# Patient Record
Sex: MALE | Race: WHITE | NOT HISPANIC OR LATINO | Employment: OTHER | URBAN - METROPOLITAN AREA
[De-identification: names, ages, dates, MRNs, and addresses within clinical notes are randomized per-mention and may not be internally consistent; named-entity substitution may affect disease eponyms.]

---

## 2017-01-12 PROBLEM — Z98.890 HISTORY OF BONE MARROW BIOPSY: Status: ACTIVE | Noted: 2017-01-12

## 2017-05-07 ENCOUNTER — HOSPITAL ENCOUNTER (OUTPATIENT)
Facility: HOSPITAL | Age: 72
Setting detail: OBSERVATION
Discharge: HOME | End: 2017-05-10
Attending: EMERGENCY MEDICINE | Admitting: INTERNAL MEDICINE

## 2017-05-07 ENCOUNTER — APPOINTMENT (OUTPATIENT)
Dept: CT IMAGING | Facility: HOSPITAL | Age: 72
End: 2017-05-07

## 2017-05-07 ENCOUNTER — APPOINTMENT (OUTPATIENT)
Dept: GENERAL RADIOLOGY | Facility: HOSPITAL | Age: 72
End: 2017-05-07

## 2017-05-07 DIAGNOSIS — Z74.09 IMPAIRED FUNCTIONAL MOBILITY, BALANCE, GAIT, AND ENDURANCE: ICD-10-CM

## 2017-05-07 DIAGNOSIS — I95.9 HYPOTENSION, UNSPECIFIED HYPOTENSION TYPE: ICD-10-CM

## 2017-05-07 DIAGNOSIS — J18.9 PNEUMONIA OF RIGHT LOWER LOBE DUE TO INFECTIOUS ORGANISM: ICD-10-CM

## 2017-05-07 DIAGNOSIS — Z74.09 IMPAIRED MOBILITY AND ADLS: ICD-10-CM

## 2017-05-07 DIAGNOSIS — R13.10 DYSPHAGIA, UNSPECIFIED TYPE: Primary | ICD-10-CM

## 2017-05-07 DIAGNOSIS — R53.83 LETHARGY: ICD-10-CM

## 2017-05-07 DIAGNOSIS — Z78.9 IMPAIRED MOBILITY AND ADLS: ICD-10-CM

## 2017-05-07 LAB
ALBUMIN SERPL-MCNC: 4.2 G/DL (ref 3.2–4.8)
ALBUMIN/GLOB SERPL: 1.3 G/DL (ref 1.5–2.5)
ALP SERPL-CCNC: 79 U/L (ref 25–100)
ALT SERPL W P-5'-P-CCNC: 4 U/L (ref 7–40)
ANION GAP SERPL CALCULATED.3IONS-SCNC: 4 MMOL/L (ref 3–11)
AST SERPL-CCNC: 18 U/L (ref 0–33)
BASOPHILS # BLD AUTO: 0.03 10*3/MM3 (ref 0–0.2)
BASOPHILS NFR BLD AUTO: 0.5 % (ref 0–1)
BILIRUB SERPL-MCNC: 0.6 MG/DL (ref 0.3–1.2)
BUN BLD-MCNC: 21 MG/DL (ref 9–23)
BUN/CREAT SERPL: 23.3 (ref 7–25)
CALCIUM SPEC-SCNC: 9.7 MG/DL (ref 8.7–10.4)
CHLORIDE SERPL-SCNC: 109 MMOL/L (ref 99–109)
CO2 SERPL-SCNC: 29 MMOL/L (ref 20–31)
CREAT BLD-MCNC: 0.9 MG/DL (ref 0.6–1.3)
D-LACTATE SERPL-SCNC: 0.9 MMOL/L (ref 0.5–2)
DEPRECATED RDW RBC AUTO: 47.1 FL (ref 37–54)
EOSINOPHIL # BLD AUTO: 0.18 10*3/MM3 (ref 0.1–0.3)
EOSINOPHIL NFR BLD AUTO: 3.2 % (ref 0–3)
ERYTHROCYTE [DISTWIDTH] IN BLOOD BY AUTOMATED COUNT: 13.5 % (ref 11.3–14.5)
GFR SERPL CREATININE-BSD FRML MDRD: 83 ML/MIN/1.73
GLOBULIN UR ELPH-MCNC: 3.2 GM/DL
GLUCOSE BLD-MCNC: 122 MG/DL (ref 70–100)
GLUCOSE BLDC GLUCOMTR-MCNC: 133 MG/DL (ref 70–130)
HCT VFR BLD AUTO: 39.6 % (ref 38.9–50.9)
HGB BLD-MCNC: 12.3 G/DL (ref 13.1–17.5)
IMM GRANULOCYTES # BLD: 0.01 10*3/MM3 (ref 0–0.03)
IMM GRANULOCYTES NFR BLD: 0.2 % (ref 0–0.6)
LYMPHOCYTES # BLD AUTO: 1.37 10*3/MM3 (ref 0.6–4.8)
LYMPHOCYTES NFR BLD AUTO: 24.7 % (ref 24–44)
MAGNESIUM SERPL-MCNC: 2 MG/DL (ref 1.3–2.7)
MCH RBC QN AUTO: 29.4 PG (ref 27–31)
MCHC RBC AUTO-ENTMCNC: 31.1 G/DL (ref 32–36)
MCV RBC AUTO: 94.5 FL (ref 80–99)
MONOCYTES # BLD AUTO: 0.65 10*3/MM3 (ref 0–1)
MONOCYTES NFR BLD AUTO: 11.7 % (ref 0–12)
NEUTROPHILS # BLD AUTO: 3.3 10*3/MM3 (ref 1.5–8.3)
NEUTROPHILS NFR BLD AUTO: 59.7 % (ref 41–71)
PLATELET # BLD AUTO: 141 10*3/MM3 (ref 150–450)
PMV BLD AUTO: 10.1 FL (ref 6–12)
POTASSIUM BLD-SCNC: 3.9 MMOL/L (ref 3.5–5.5)
PROT SERPL-MCNC: 7.4 G/DL (ref 5.7–8.2)
RBC # BLD AUTO: 4.19 10*6/MM3 (ref 4.2–5.76)
SODIUM BLD-SCNC: 142 MMOL/L (ref 132–146)
TROPONIN I SERPL-MCNC: 0 NG/ML (ref 0–0.07)
WBC NRBC COR # BLD: 5.54 10*3/MM3 (ref 3.5–10.8)

## 2017-05-07 PROCEDURE — 99285 EMERGENCY DEPT VISIT HI MDM: CPT

## 2017-05-07 PROCEDURE — 93005 ELECTROCARDIOGRAM TRACING: CPT | Performed by: EMERGENCY MEDICINE

## 2017-05-07 PROCEDURE — 84145 PROCALCITONIN (PCT): CPT | Performed by: EMERGENCY MEDICINE

## 2017-05-07 PROCEDURE — 96361 HYDRATE IV INFUSION ADD-ON: CPT

## 2017-05-07 PROCEDURE — 84484 ASSAY OF TROPONIN QUANT: CPT

## 2017-05-07 PROCEDURE — 80053 COMPREHEN METABOLIC PANEL: CPT | Performed by: EMERGENCY MEDICINE

## 2017-05-07 PROCEDURE — 81001 URINALYSIS AUTO W/SCOPE: CPT | Performed by: EMERGENCY MEDICINE

## 2017-05-07 PROCEDURE — 70450 CT HEAD/BRAIN W/O DYE: CPT

## 2017-05-07 PROCEDURE — 85025 COMPLETE CBC W/AUTO DIFF WBC: CPT | Performed by: EMERGENCY MEDICINE

## 2017-05-07 PROCEDURE — 87040 BLOOD CULTURE FOR BACTERIA: CPT | Performed by: EMERGENCY MEDICINE

## 2017-05-07 PROCEDURE — 83605 ASSAY OF LACTIC ACID: CPT | Performed by: EMERGENCY MEDICINE

## 2017-05-07 PROCEDURE — 80306 DRUG TEST PRSMV INSTRMNT: CPT | Performed by: EMERGENCY MEDICINE

## 2017-05-07 PROCEDURE — 83735 ASSAY OF MAGNESIUM: CPT | Performed by: EMERGENCY MEDICINE

## 2017-05-07 PROCEDURE — 82962 GLUCOSE BLOOD TEST: CPT

## 2017-05-07 PROCEDURE — 71010 HC CHEST PA OR AP: CPT

## 2017-05-07 RX ORDER — SODIUM CHLORIDE 0.9 % (FLUSH) 0.9 %
10 SYRINGE (ML) INJECTION AS NEEDED
Status: DISCONTINUED | OUTPATIENT
Start: 2017-05-07 | End: 2017-05-10 | Stop reason: HOSPADM

## 2017-05-07 RX ORDER — NITROFURANTOIN MACROCRYSTALS 100 MG/1
100 CAPSULE ORAL 4 TIMES DAILY
COMMUNITY
End: 2017-11-22 | Stop reason: HOSPADM

## 2017-05-07 RX ORDER — CALCIUM CARBONATE 200(500)MG
1 TABLET,CHEWABLE ORAL DAILY
COMMUNITY

## 2017-05-07 RX ORDER — DOCUSATE SODIUM 100 MG/1
100 CAPSULE, LIQUID FILLED ORAL 2 TIMES DAILY
COMMUNITY

## 2017-05-07 RX ORDER — GUAIFENESIN 600 MG/1
1200 TABLET, EXTENDED RELEASE ORAL 2 TIMES DAILY
COMMUNITY
End: 2017-11-22 | Stop reason: HOSPADM

## 2017-05-07 RX ORDER — VANCOMYCIN/0.9 % SOD CHLORIDE 1.5G/250ML
20 PLASTIC BAG, INJECTION (ML) INTRAVENOUS ONCE
Status: COMPLETED | OUTPATIENT
Start: 2017-05-07 | End: 2017-05-08

## 2017-05-07 RX ORDER — ALBUTEROL SULFATE 4 MG/1
4 TABLET, FILM COATED, EXTENDED RELEASE ORAL EVERY 12 HOURS
COMMUNITY
End: 2017-11-22 | Stop reason: HOSPADM

## 2017-05-07 RX ADMIN — SODIUM CHLORIDE 1000 ML: 9 INJECTION, SOLUTION INTRAVENOUS at 22:52

## 2017-05-08 ENCOUNTER — APPOINTMENT (OUTPATIENT)
Dept: MRI IMAGING | Facility: HOSPITAL | Age: 72
End: 2017-05-08

## 2017-05-08 PROBLEM — J18.9 PNEUMONIA OF RIGHT LOWER LOBE DUE TO INFECTIOUS ORGANISM: Status: ACTIVE | Noted: 2017-05-08

## 2017-05-08 LAB
ALBUMIN SERPL-MCNC: 3.5 G/DL (ref 3.2–4.8)
ALBUMIN/GLOB SERPL: 1.3 G/DL (ref 1.5–2.5)
ALP SERPL-CCNC: 62 U/L (ref 25–100)
ALT SERPL W P-5'-P-CCNC: 7 U/L (ref 7–40)
AMMONIA BLD-SCNC: 25 UMOL/L (ref 19–60)
AMPHET+METHAMPHET UR QL: NEGATIVE
AMPHETAMINES UR QL: NEGATIVE
ANION GAP SERPL CALCULATED.3IONS-SCNC: 2 MMOL/L (ref 3–11)
ARTICHOKE IGE QN: 57 MG/DL (ref 0–130)
AST SERPL-CCNC: 14 U/L (ref 0–33)
BACTERIA UR QL AUTO: ABNORMAL /HPF
BARBITURATES UR QL SCN: NEGATIVE
BASOPHILS # BLD AUTO: 0.04 10*3/MM3 (ref 0–0.2)
BASOPHILS NFR BLD AUTO: 0.8 % (ref 0–1)
BENZODIAZ UR QL SCN: NEGATIVE
BILIRUB SERPL-MCNC: 0.6 MG/DL (ref 0.3–1.2)
BILIRUB UR QL STRIP: ABNORMAL
BUN BLD-MCNC: 21 MG/DL (ref 9–23)
BUN/CREAT SERPL: 26.3 (ref 7–25)
BUPRENORPHINE SERPL-MCNC: NEGATIVE NG/ML
CALCIUM SPEC-SCNC: 8.8 MG/DL (ref 8.7–10.4)
CANNABINOIDS SERPL QL: NEGATIVE
CHLORIDE SERPL-SCNC: 113 MMOL/L (ref 99–109)
CHOLEST SERPL-MCNC: 123 MG/DL (ref 0–200)
CLARITY UR: ABNORMAL
CO2 SERPL-SCNC: 27 MMOL/L (ref 20–31)
COCAINE UR QL: NEGATIVE
COLOR UR: ABNORMAL
CREAT BLD-MCNC: 0.8 MG/DL (ref 0.6–1.3)
DEPRECATED RDW RBC AUTO: 46.9 FL (ref 37–54)
EOSINOPHIL # BLD AUTO: 0.15 10*3/MM3 (ref 0.1–0.3)
EOSINOPHIL NFR BLD AUTO: 2.9 % (ref 0–3)
ERYTHROCYTE [DISTWIDTH] IN BLOOD BY AUTOMATED COUNT: 13.5 % (ref 11.3–14.5)
GFR SERPL CREATININE-BSD FRML MDRD: 96 ML/MIN/1.73
GLOBULIN UR ELPH-MCNC: 2.8 GM/DL
GLUCOSE BLD-MCNC: 99 MG/DL (ref 70–100)
GLUCOSE BLDC GLUCOMTR-MCNC: 103 MG/DL (ref 70–130)
GLUCOSE BLDC GLUCOMTR-MCNC: 105 MG/DL (ref 70–130)
GLUCOSE BLDC GLUCOMTR-MCNC: 87 MG/DL (ref 70–130)
GLUCOSE BLDC GLUCOMTR-MCNC: 88 MG/DL (ref 70–130)
GLUCOSE UR STRIP-MCNC: NEGATIVE MG/DL
HCT VFR BLD AUTO: 34.6 % (ref 38.9–50.9)
HDLC SERPL-MCNC: 48 MG/DL (ref 40–60)
HGB BLD-MCNC: 10.8 G/DL (ref 13.1–17.5)
HGB UR QL STRIP.AUTO: NEGATIVE
HOLD SPECIMEN: NORMAL
HYALINE CASTS UR QL AUTO: ABNORMAL /LPF
IMM GRANULOCYTES # BLD: 0.01 10*3/MM3 (ref 0–0.03)
IMM GRANULOCYTES NFR BLD: 0.2 % (ref 0–0.6)
KETONES UR QL STRIP: ABNORMAL
LEUKOCYTE ESTERASE UR QL STRIP.AUTO: ABNORMAL
LYMPHOCYTES # BLD AUTO: 1.36 10*3/MM3 (ref 0.6–4.8)
LYMPHOCYTES NFR BLD AUTO: 26.4 % (ref 24–44)
MCH RBC QN AUTO: 29.4 PG (ref 27–31)
MCHC RBC AUTO-ENTMCNC: 31.2 G/DL (ref 32–36)
MCV RBC AUTO: 94.3 FL (ref 80–99)
METHADONE UR QL SCN: NEGATIVE
MONOCYTES # BLD AUTO: 0.53 10*3/MM3 (ref 0–1)
MONOCYTES NFR BLD AUTO: 10.3 % (ref 0–12)
NEUTROPHILS # BLD AUTO: 3.06 10*3/MM3 (ref 1.5–8.3)
NEUTROPHILS NFR BLD AUTO: 59.4 % (ref 41–71)
NITRITE UR QL STRIP: NEGATIVE
OPIATES UR QL: NEGATIVE
OXYCODONE UR QL SCN: NEGATIVE
PCP UR QL SCN: NEGATIVE
PH UR STRIP.AUTO: <=5 [PH] (ref 5–8)
PLATELET # BLD AUTO: 120 10*3/MM3 (ref 150–450)
PMV BLD AUTO: 10.3 FL (ref 6–12)
POTASSIUM BLD-SCNC: 3.9 MMOL/L (ref 3.5–5.5)
PROCALCITONIN SERPL-MCNC: <0.05 NG/ML
PROPOXYPH UR QL: NEGATIVE
PROT SERPL-MCNC: 6.3 G/DL (ref 5.7–8.2)
PROT UR QL STRIP: ABNORMAL
RBC # BLD AUTO: 3.67 10*6/MM3 (ref 4.2–5.76)
RBC # UR: ABNORMAL /HPF
REF LAB TEST METHOD: ABNORMAL
SODIUM BLD-SCNC: 142 MMOL/L (ref 132–146)
SP GR UR STRIP: 1.04 (ref 1–1.03)
SQUAMOUS #/AREA URNS HPF: ABNORMAL /HPF
TRICYCLICS UR QL SCN: NEGATIVE
TRIGL SERPL-MCNC: 47 MG/DL (ref 0–150)
UROBILINOGEN UR QL STRIP: ABNORMAL
WBC NRBC COR # BLD: 5.15 10*3/MM3 (ref 3.5–10.8)
WBC UR QL AUTO: ABNORMAL /HPF
WHOLE BLOOD HOLD SPECIMEN: NORMAL
WHOLE BLOOD HOLD SPECIMEN: NORMAL

## 2017-05-08 PROCEDURE — G0378 HOSPITAL OBSERVATION PER HR: HCPCS

## 2017-05-08 PROCEDURE — 92610 EVALUATE SWALLOWING FUNCTION: CPT

## 2017-05-08 PROCEDURE — 80061 LIPID PANEL: CPT | Performed by: INTERNAL MEDICINE

## 2017-05-08 PROCEDURE — 99215 OFFICE O/P EST HI 40 MIN: CPT | Performed by: PSYCHIATRY & NEUROLOGY

## 2017-05-08 PROCEDURE — 82140 ASSAY OF AMMONIA: CPT | Performed by: INTERNAL MEDICINE

## 2017-05-08 PROCEDURE — 25010000002 VANCOMYCIN HCL IN NACL 1.5-0.9 GM/500ML-% SOLUTION: Performed by: EMERGENCY MEDICINE

## 2017-05-08 PROCEDURE — 70551 MRI BRAIN STEM W/O DYE: CPT

## 2017-05-08 PROCEDURE — 25010000002 VANCOMYCIN

## 2017-05-08 PROCEDURE — 82962 GLUCOSE BLOOD TEST: CPT

## 2017-05-08 PROCEDURE — 96366 THER/PROPH/DIAG IV INF ADDON: CPT

## 2017-05-08 PROCEDURE — 99220 PR INITIAL OBSERVATION CARE/DAY 70 MINUTES: CPT | Performed by: INTERNAL MEDICINE

## 2017-05-08 PROCEDURE — G8996 SWALLOW CURRENT STATUS: HCPCS

## 2017-05-08 PROCEDURE — 96368 THER/DIAG CONCURRENT INF: CPT

## 2017-05-08 PROCEDURE — 25010000002 PIPERACILLIN-TAZOBACTAM: Performed by: INTERNAL MEDICINE

## 2017-05-08 PROCEDURE — 96372 THER/PROPH/DIAG INJ SC/IM: CPT

## 2017-05-08 PROCEDURE — 80053 COMPREHEN METABOLIC PANEL: CPT | Performed by: INTERNAL MEDICINE

## 2017-05-08 PROCEDURE — 85025 COMPLETE CBC W/AUTO DIFF WBC: CPT | Performed by: INTERNAL MEDICINE

## 2017-05-08 PROCEDURE — 96365 THER/PROPH/DIAG IV INF INIT: CPT

## 2017-05-08 PROCEDURE — 25010000002 PIPERACILLIN-TAZOBACTAM: Performed by: EMERGENCY MEDICINE

## 2017-05-08 PROCEDURE — 25010000002 HEPARIN (PORCINE) PER 1000 UNITS: Performed by: INTERNAL MEDICINE

## 2017-05-08 PROCEDURE — G8997 SWALLOW GOAL STATUS: HCPCS

## 2017-05-08 RX ORDER — CARBIDOPA AND LEVODOPA 50; 200 MG/1; MG/1
1 TABLET, EXTENDED RELEASE ORAL EVERY 6 HOURS SCHEDULED
Status: DISCONTINUED | OUTPATIENT
Start: 2017-05-08 | End: 2017-05-08

## 2017-05-08 RX ORDER — ONDANSETRON 2 MG/ML
4 INJECTION INTRAMUSCULAR; INTRAVENOUS EVERY 6 HOURS PRN
Status: DISCONTINUED | OUTPATIENT
Start: 2017-05-08 | End: 2017-05-10 | Stop reason: HOSPADM

## 2017-05-08 RX ORDER — FAMOTIDINE 20 MG/1
20 TABLET, FILM COATED ORAL 2 TIMES DAILY
Status: DISCONTINUED | OUTPATIENT
Start: 2017-05-08 | End: 2017-05-10 | Stop reason: HOSPADM

## 2017-05-08 RX ORDER — LANOLIN ALCOHOL/MO/W.PET/CERES
500 CREAM (GRAM) TOPICAL DAILY
Status: DISCONTINUED | OUTPATIENT
Start: 2017-05-08 | End: 2017-05-10 | Stop reason: HOSPADM

## 2017-05-08 RX ORDER — VANCOMYCIN HYDROCHLORIDE
15 EVERY 12 HOURS
Status: DISCONTINUED | OUTPATIENT
Start: 2017-05-08 | End: 2017-05-08

## 2017-05-08 RX ORDER — ASPIRIN 81 MG/1
81 TABLET, CHEWABLE ORAL DAILY
Status: DISCONTINUED | OUTPATIENT
Start: 2017-05-08 | End: 2017-05-10 | Stop reason: HOSPADM

## 2017-05-08 RX ORDER — PRAVASTATIN SODIUM 40 MG
80 TABLET ORAL NIGHTLY
Status: DISCONTINUED | OUTPATIENT
Start: 2017-05-08 | End: 2017-05-10 | Stop reason: HOSPADM

## 2017-05-08 RX ORDER — ENTACAPONE 200 MG/1
200 TABLET ORAL
Status: DISCONTINUED | OUTPATIENT
Start: 2017-05-08 | End: 2017-05-08

## 2017-05-08 RX ORDER — CLOPIDOGREL BISULFATE 75 MG/1
75 TABLET ORAL DAILY
Status: DISCONTINUED | OUTPATIENT
Start: 2017-05-08 | End: 2017-05-10 | Stop reason: HOSPADM

## 2017-05-08 RX ORDER — DOCUSATE SODIUM 100 MG/1
100 CAPSULE, LIQUID FILLED ORAL 2 TIMES DAILY
Status: DISCONTINUED | OUTPATIENT
Start: 2017-05-08 | End: 2017-05-10 | Stop reason: HOSPADM

## 2017-05-08 RX ORDER — CALCIUM CARBONATE 200(500)MG
1 TABLET,CHEWABLE ORAL DAILY
Status: DISCONTINUED | OUTPATIENT
Start: 2017-05-08 | End: 2017-05-10 | Stop reason: HOSPADM

## 2017-05-08 RX ORDER — HEPARIN SODIUM 5000 [USP'U]/ML
5000 INJECTION, SOLUTION INTRAVENOUS; SUBCUTANEOUS EVERY 12 HOURS SCHEDULED
Status: DISCONTINUED | OUTPATIENT
Start: 2017-05-08 | End: 2017-05-10 | Stop reason: HOSPADM

## 2017-05-08 RX ORDER — PRAMIPEXOLE DIHYDROCHLORIDE 1 MG/1
1 TABLET ORAL EVERY 8 HOURS SCHEDULED
Status: DISCONTINUED | OUTPATIENT
Start: 2017-05-08 | End: 2017-05-10 | Stop reason: HOSPADM

## 2017-05-08 RX ORDER — DOCUSATE SODIUM 100 MG/1
100 CAPSULE, LIQUID FILLED ORAL 2 TIMES DAILY
Status: DISCONTINUED | OUTPATIENT
Start: 2017-05-08 | End: 2017-05-08 | Stop reason: SDUPTHER

## 2017-05-08 RX ORDER — ONDANSETRON 4 MG/1
4 TABLET, FILM COATED ORAL EVERY 6 HOURS PRN
Status: DISCONTINUED | OUTPATIENT
Start: 2017-05-08 | End: 2017-05-10 | Stop reason: HOSPADM

## 2017-05-08 RX ORDER — ENTACAPONE 200 MG/1
200 TABLET ORAL 4 TIMES DAILY
Status: DISCONTINUED | OUTPATIENT
Start: 2017-05-08 | End: 2017-05-10 | Stop reason: HOSPADM

## 2017-05-08 RX ORDER — SODIUM CHLORIDE 0.9 % (FLUSH) 0.9 %
1-10 SYRINGE (ML) INJECTION AS NEEDED
Status: DISCONTINUED | OUTPATIENT
Start: 2017-05-08 | End: 2017-05-10 | Stop reason: HOSPADM

## 2017-05-08 RX ORDER — CARBIDOPA AND LEVODOPA 50; 200 MG/1; MG/1
1 TABLET, EXTENDED RELEASE ORAL EVERY 6 HOURS SCHEDULED
Status: DISCONTINUED | OUTPATIENT
Start: 2017-05-08 | End: 2017-05-10 | Stop reason: HOSPADM

## 2017-05-08 RX ORDER — DONEPEZIL HYDROCHLORIDE 10 MG/1
10 TABLET, FILM COATED ORAL NIGHTLY
Status: DISCONTINUED | OUTPATIENT
Start: 2017-05-08 | End: 2017-05-10 | Stop reason: HOSPADM

## 2017-05-08 RX ORDER — SODIUM CHLORIDE 9 MG/ML
50 INJECTION, SOLUTION INTRAVENOUS CONTINUOUS
Status: DISCONTINUED | OUTPATIENT
Start: 2017-05-08 | End: 2017-05-10 | Stop reason: HOSPADM

## 2017-05-08 RX ORDER — SERTRALINE HYDROCHLORIDE 25 MG/1
25 TABLET, FILM COATED ORAL DAILY
Status: DISCONTINUED | OUTPATIENT
Start: 2017-05-08 | End: 2017-05-10 | Stop reason: HOSPADM

## 2017-05-08 RX ORDER — ACETAMINOPHEN 325 MG/1
650 TABLET ORAL EVERY 6 HOURS PRN
Status: DISCONTINUED | OUTPATIENT
Start: 2017-05-08 | End: 2017-05-10 | Stop reason: HOSPADM

## 2017-05-08 RX ADMIN — PIPERACILLIN SODIUM,TAZOBACTAM SODIUM 3.38 G: 3; .375 INJECTION, POWDER, FOR SOLUTION INTRAVENOUS at 18:13

## 2017-05-08 RX ADMIN — Medication 5000 UNITS: at 18:10

## 2017-05-08 RX ADMIN — FAMOTIDINE 20 MG: 20 TABLET ORAL at 18:11

## 2017-05-08 RX ADMIN — CLOPIDOGREL BISULFATE 75 MG: 75 TABLET ORAL at 18:10

## 2017-05-08 RX ADMIN — PIPERACILLIN SODIUM,TAZOBACTAM SODIUM 3.38 G: 3; .375 INJECTION, POWDER, FOR SOLUTION INTRAVENOUS at 05:13

## 2017-05-08 RX ADMIN — CYANOCOBALAMIN TAB 1000 MCG 500 MCG: 1000 TAB at 18:10

## 2017-05-08 RX ADMIN — Medication 1500 MG: at 00:45

## 2017-05-08 RX ADMIN — ASPIRIN 81 MG 81 MG: 81 TABLET ORAL at 18:11

## 2017-05-08 RX ADMIN — ENTACAPONE 200 MG: 200 TABLET, FILM COATED ORAL at 19:01

## 2017-05-08 RX ADMIN — PIPERACILLIN SODIUM,TAZOBACTAM SODIUM 3.38 G: 3; .375 INJECTION, POWDER, FOR SOLUTION INTRAVENOUS at 23:14

## 2017-05-08 RX ADMIN — HEPARIN SODIUM 5000 UNITS: 5000 INJECTION, SOLUTION INTRAVENOUS; SUBCUTANEOUS at 20:44

## 2017-05-08 RX ADMIN — CARBIDOPA AND LEVODOPA 1 TABLET: 50; 200 TABLET, EXTENDED RELEASE ORAL at 19:01

## 2017-05-08 RX ADMIN — DOCUSATE SODIUM 100 MG: 100 CAPSULE, LIQUID FILLED ORAL at 18:10

## 2017-05-08 RX ADMIN — PRAMIPEXOLE DIHYDROCHLORIDE 1 MG: 1 TABLET ORAL at 18:09

## 2017-05-08 RX ADMIN — VANCOMYCIN HYDROCHLORIDE 1250 MG: 1 INJECTION, POWDER, LYOPHILIZED, FOR SOLUTION INTRAVENOUS at 19:48

## 2017-05-08 RX ADMIN — HEPARIN SODIUM 5000 UNITS: 5000 INJECTION, SOLUTION INTRAVENOUS; SUBCUTANEOUS at 09:14

## 2017-05-08 RX ADMIN — CALCIUM CARBONATE 1 TABLET: 500 TABLET ORAL at 18:14

## 2017-05-08 RX ADMIN — TAZOBACTAM SODIUM AND PIPERACILLIN SODIUM 4.5 G: .5; 4 INJECTION, POWDER, LYOPHILIZED, FOR SOLUTION INTRAVENOUS at 00:15

## 2017-05-08 RX ADMIN — SODIUM CHLORIDE 50 ML/HR: 9 INJECTION, SOLUTION INTRAVENOUS at 02:14

## 2017-05-08 RX ADMIN — PRAVASTATIN SODIUM 80 MG: 40 TABLET ORAL at 20:44

## 2017-05-08 RX ADMIN — DONEPEZIL HYDROCHLORIDE 10 MG: 10 TABLET, FILM COATED ORAL at 20:44

## 2017-05-08 RX ADMIN — SERTRALINE HYDROCHLORIDE 25 MG: 25 TABLET ORAL at 18:11

## 2017-05-09 LAB
GLUCOSE BLDC GLUCOMTR-MCNC: 115 MG/DL (ref 70–130)
GLUCOSE BLDC GLUCOMTR-MCNC: 131 MG/DL (ref 70–130)
GLUCOSE BLDC GLUCOMTR-MCNC: 139 MG/DL (ref 70–130)
GLUCOSE BLDC GLUCOMTR-MCNC: 98 MG/DL (ref 70–130)

## 2017-05-09 PROCEDURE — 97165 OT EVAL LOW COMPLEX 30 MIN: CPT

## 2017-05-09 PROCEDURE — 99226 PR SBSQ OBSERVATION CARE/DAY 35 MINUTES: CPT | Performed by: INTERNAL MEDICINE

## 2017-05-09 PROCEDURE — G0378 HOSPITAL OBSERVATION PER HR: HCPCS

## 2017-05-09 PROCEDURE — 96372 THER/PROPH/DIAG INJ SC/IM: CPT

## 2017-05-09 PROCEDURE — 92610 EVALUATE SWALLOWING FUNCTION: CPT

## 2017-05-09 PROCEDURE — 97162 PT EVAL MOD COMPLEX 30 MIN: CPT

## 2017-05-09 PROCEDURE — 97530 THERAPEUTIC ACTIVITIES: CPT

## 2017-05-09 PROCEDURE — G8978 MOBILITY CURRENT STATUS: HCPCS

## 2017-05-09 PROCEDURE — 25010000002 VANCOMYCIN

## 2017-05-09 PROCEDURE — 25010000002 PIPERACILLIN-TAZOBACTAM: Performed by: INTERNAL MEDICINE

## 2017-05-09 PROCEDURE — 96366 THER/PROPH/DIAG IV INF ADDON: CPT

## 2017-05-09 PROCEDURE — G8979 MOBILITY GOAL STATUS: HCPCS

## 2017-05-09 PROCEDURE — 25010000002 HEPARIN (PORCINE) PER 1000 UNITS: Performed by: INTERNAL MEDICINE

## 2017-05-09 PROCEDURE — 99213 OFFICE O/P EST LOW 20 MIN: CPT | Performed by: PSYCHIATRY & NEUROLOGY

## 2017-05-09 PROCEDURE — 82962 GLUCOSE BLOOD TEST: CPT

## 2017-05-09 RX ADMIN — CYANOCOBALAMIN TAB 1000 MCG 500 MCG: 1000 TAB at 09:24

## 2017-05-09 RX ADMIN — DOCUSATE SODIUM 100 MG: 100 CAPSULE, LIQUID FILLED ORAL at 09:19

## 2017-05-09 RX ADMIN — PRAVASTATIN SODIUM 80 MG: 40 TABLET ORAL at 21:27

## 2017-05-09 RX ADMIN — ENTACAPONE 200 MG: 200 TABLET, FILM COATED ORAL at 23:09

## 2017-05-09 RX ADMIN — Medication 5000 UNITS: at 09:19

## 2017-05-09 RX ADMIN — FAMOTIDINE 20 MG: 20 TABLET ORAL at 17:43

## 2017-05-09 RX ADMIN — CLOPIDOGREL BISULFATE 75 MG: 75 TABLET ORAL at 09:19

## 2017-05-09 RX ADMIN — PRAMIPEXOLE DIHYDROCHLORIDE 1 MG: 1 TABLET ORAL at 21:27

## 2017-05-09 RX ADMIN — VANCOMYCIN HYDROCHLORIDE 1250 MG: 1 INJECTION, POWDER, LYOPHILIZED, FOR SOLUTION INTRAVENOUS at 09:18

## 2017-05-09 RX ADMIN — PRAMIPEXOLE DIHYDROCHLORIDE 1 MG: 1 TABLET ORAL at 14:32

## 2017-05-09 RX ADMIN — PRAMIPEXOLE DIHYDROCHLORIDE 1 MG: 1 TABLET ORAL at 06:01

## 2017-05-09 RX ADMIN — PIPERACILLIN SODIUM,TAZOBACTAM SODIUM 3.38 G: 3; .375 INJECTION, POWDER, FOR SOLUTION INTRAVENOUS at 05:19

## 2017-05-09 RX ADMIN — HEPARIN SODIUM 5000 UNITS: 5000 INJECTION, SOLUTION INTRAVENOUS; SUBCUTANEOUS at 09:19

## 2017-05-09 RX ADMIN — CARBIDOPA AND LEVODOPA 1 TABLET: 50; 200 TABLET, EXTENDED RELEASE ORAL at 17:43

## 2017-05-09 RX ADMIN — DONEPEZIL HYDROCHLORIDE 10 MG: 10 TABLET, FILM COATED ORAL at 21:27

## 2017-05-09 RX ADMIN — ASPIRIN 81 MG 81 MG: 81 TABLET ORAL at 09:19

## 2017-05-09 RX ADMIN — FAMOTIDINE 20 MG: 20 TABLET ORAL at 09:19

## 2017-05-09 RX ADMIN — DOCUSATE SODIUM 100 MG: 100 CAPSULE, LIQUID FILLED ORAL at 17:43

## 2017-05-09 RX ADMIN — ENTACAPONE 200 MG: 200 TABLET, FILM COATED ORAL at 06:01

## 2017-05-09 RX ADMIN — HEPARIN SODIUM 5000 UNITS: 5000 INJECTION, SOLUTION INTRAVENOUS; SUBCUTANEOUS at 21:27

## 2017-05-09 RX ADMIN — PIPERACILLIN SODIUM,TAZOBACTAM SODIUM 3.38 G: 3; .375 INJECTION, POWDER, FOR SOLUTION INTRAVENOUS at 11:58

## 2017-05-09 RX ADMIN — SODIUM CHLORIDE 50 ML/HR: 9 INJECTION, SOLUTION INTRAVENOUS at 03:23

## 2017-05-09 RX ADMIN — CALCIUM CARBONATE 1 TABLET: 500 TABLET ORAL at 09:20

## 2017-05-09 RX ADMIN — PIPERACILLIN SODIUM,TAZOBACTAM SODIUM 3.38 G: 3; .375 INJECTION, POWDER, FOR SOLUTION INTRAVENOUS at 23:09

## 2017-05-09 RX ADMIN — SERTRALINE HYDROCHLORIDE 25 MG: 25 TABLET ORAL at 09:19

## 2017-05-09 RX ADMIN — PIPERACILLIN SODIUM,TAZOBACTAM SODIUM 3.38 G: 3; .375 INJECTION, POWDER, FOR SOLUTION INTRAVENOUS at 17:43

## 2017-05-09 RX ADMIN — ENTACAPONE 200 MG: 200 TABLET, FILM COATED ORAL at 17:43

## 2017-05-09 RX ADMIN — CARBIDOPA AND LEVODOPA 1 TABLET: 50; 200 TABLET, EXTENDED RELEASE ORAL at 06:01

## 2017-05-09 RX ADMIN — CARBIDOPA AND LEVODOPA 1 TABLET: 50; 200 TABLET, EXTENDED RELEASE ORAL at 12:01

## 2017-05-09 RX ADMIN — CARBIDOPA AND LEVODOPA 1 TABLET: 50; 200 TABLET, EXTENDED RELEASE ORAL at 23:09

## 2017-05-09 RX ADMIN — ENTACAPONE 200 MG: 200 TABLET, FILM COATED ORAL at 11:57

## 2017-05-10 VITALS
TEMPERATURE: 98.4 F | BODY MASS INDEX: 24.88 KG/M2 | RESPIRATION RATE: 18 BRPM | WEIGHT: 177.7 LBS | OXYGEN SATURATION: 100 % | HEIGHT: 71 IN | SYSTOLIC BLOOD PRESSURE: 123 MMHG | HEART RATE: 58 BPM | DIASTOLIC BLOOD PRESSURE: 61 MMHG

## 2017-05-10 LAB
GLUCOSE BLDC GLUCOMTR-MCNC: 115 MG/DL (ref 70–130)
GLUCOSE BLDC GLUCOMTR-MCNC: 96 MG/DL (ref 70–130)

## 2017-05-10 PROCEDURE — G0378 HOSPITAL OBSERVATION PER HR: HCPCS

## 2017-05-10 PROCEDURE — 96366 THER/PROPH/DIAG IV INF ADDON: CPT

## 2017-05-10 PROCEDURE — G8978 MOBILITY CURRENT STATUS: HCPCS

## 2017-05-10 PROCEDURE — 25010000002 HEPARIN (PORCINE) PER 1000 UNITS: Performed by: INTERNAL MEDICINE

## 2017-05-10 PROCEDURE — 96372 THER/PROPH/DIAG INJ SC/IM: CPT

## 2017-05-10 PROCEDURE — 99217 PR OBSERVATION CARE DISCHARGE MANAGEMENT: CPT | Performed by: INTERNAL MEDICINE

## 2017-05-10 PROCEDURE — G8980 MOBILITY D/C STATUS: HCPCS

## 2017-05-10 PROCEDURE — 25010000002 PIPERACILLIN-TAZOBACTAM: Performed by: INTERNAL MEDICINE

## 2017-05-10 PROCEDURE — G8979 MOBILITY GOAL STATUS: HCPCS

## 2017-05-10 PROCEDURE — 82962 GLUCOSE BLOOD TEST: CPT

## 2017-05-10 PROCEDURE — 97116 GAIT TRAINING THERAPY: CPT

## 2017-05-10 PROCEDURE — 99213 OFFICE O/P EST LOW 20 MIN: CPT | Performed by: PSYCHIATRY & NEUROLOGY

## 2017-05-10 PROCEDURE — 97530 THERAPEUTIC ACTIVITIES: CPT

## 2017-05-10 RX ORDER — AMOXICILLIN AND CLAVULANATE POTASSIUM 875; 125 MG/1; MG/1
1 TABLET, FILM COATED ORAL 2 TIMES DAILY
Qty: 8 TABLET | Refills: 0 | Status: SHIPPED | OUTPATIENT
Start: 2017-05-10 | End: 2017-05-14

## 2017-05-10 RX ORDER — CARBIDOPA AND LEVODOPA 50; 200 MG/1; MG/1
1 TABLET, EXTENDED RELEASE ORAL EVERY 6 HOURS SCHEDULED
Qty: 18 TABLET | Refills: 0 | Status: ON HOLD | OUTPATIENT
Start: 2017-05-10 | End: 2017-11-18

## 2017-05-10 RX ORDER — ALBUTEROL SULFATE 2.5 MG/3ML
2.5 SOLUTION RESPIRATORY (INHALATION) EVERY 6 HOURS PRN
Status: DISCONTINUED | OUTPATIENT
Start: 2017-05-10 | End: 2017-05-10 | Stop reason: HOSPADM

## 2017-05-10 RX ADMIN — ENTACAPONE 200 MG: 200 TABLET, FILM COATED ORAL at 05:50

## 2017-05-10 RX ADMIN — FAMOTIDINE 20 MG: 20 TABLET ORAL at 08:18

## 2017-05-10 RX ADMIN — CYANOCOBALAMIN TAB 1000 MCG 500 MCG: 1000 TAB at 08:23

## 2017-05-10 RX ADMIN — CALCIUM CARBONATE 1 TABLET: 500 TABLET ORAL at 08:23

## 2017-05-10 RX ADMIN — DOCUSATE SODIUM 100 MG: 100 CAPSULE, LIQUID FILLED ORAL at 08:18

## 2017-05-10 RX ADMIN — HEPARIN SODIUM 5000 UNITS: 5000 INJECTION, SOLUTION INTRAVENOUS; SUBCUTANEOUS at 08:18

## 2017-05-10 RX ADMIN — PRAMIPEXOLE DIHYDROCHLORIDE 1 MG: 1 TABLET ORAL at 05:50

## 2017-05-10 RX ADMIN — CARBIDOPA AND LEVODOPA 1 TABLET: 50; 200 TABLET, EXTENDED RELEASE ORAL at 12:22

## 2017-05-10 RX ADMIN — PIPERACILLIN SODIUM,TAZOBACTAM SODIUM 3.38 G: 3; .375 INJECTION, POWDER, FOR SOLUTION INTRAVENOUS at 06:08

## 2017-05-10 RX ADMIN — Medication 5000 UNITS: at 08:18

## 2017-05-10 RX ADMIN — CARBIDOPA AND LEVODOPA 1 TABLET: 50; 200 TABLET, EXTENDED RELEASE ORAL at 05:50

## 2017-05-10 RX ADMIN — PIPERACILLIN SODIUM,TAZOBACTAM SODIUM 3.38 G: 3; .375 INJECTION, POWDER, FOR SOLUTION INTRAVENOUS at 12:21

## 2017-05-10 RX ADMIN — ASPIRIN 81 MG 81 MG: 81 TABLET ORAL at 08:18

## 2017-05-10 RX ADMIN — CLOPIDOGREL BISULFATE 75 MG: 75 TABLET ORAL at 08:18

## 2017-05-10 RX ADMIN — ENTACAPONE 200 MG: 200 TABLET, FILM COATED ORAL at 12:21

## 2017-05-10 RX ADMIN — SODIUM CHLORIDE 50 ML/HR: 9 INJECTION, SOLUTION INTRAVENOUS at 01:18

## 2017-05-10 RX ADMIN — SERTRALINE HYDROCHLORIDE 25 MG: 25 TABLET ORAL at 08:18

## 2017-05-12 LAB
BACTERIA SPEC AEROBE CULT: NORMAL
BACTERIA SPEC AEROBE CULT: NORMAL

## 2017-06-30 ENCOUNTER — OFFICE VISIT (OUTPATIENT)
Dept: ONCOLOGY | Facility: CLINIC | Age: 72
End: 2017-06-30

## 2017-06-30 VITALS
HEART RATE: 60 BPM | BODY MASS INDEX: 23.8 KG/M2 | SYSTOLIC BLOOD PRESSURE: 154 MMHG | WEIGHT: 170 LBS | TEMPERATURE: 97.6 F | RESPIRATION RATE: 20 BRPM | DIASTOLIC BLOOD PRESSURE: 70 MMHG | HEIGHT: 71 IN

## 2017-06-30 DIAGNOSIS — D64.9 CHRONIC ANEMIA: Chronic | ICD-10-CM

## 2017-06-30 DIAGNOSIS — D69.6 THROMBOCYTOPENIA (HCC): Primary | ICD-10-CM

## 2017-06-30 PROCEDURE — 99213 OFFICE O/P EST LOW 20 MIN: CPT | Performed by: INTERNAL MEDICINE

## 2017-06-30 RX ORDER — NITROFURANTOIN 25; 75 MG/1; MG/1
CAPSULE ORAL
Refills: 0 | COMMUNITY
Start: 2017-06-09 | End: 2017-11-22 | Stop reason: HOSPADM

## 2017-06-30 RX ORDER — DONEPEZIL HYDROCHLORIDE 23 MG/1
TABLET, FILM COATED ORAL
Refills: 5 | COMMUNITY
Start: 2017-06-09

## 2017-07-03 NOTE — PROGRESS NOTES
"PROBLEM LIST:  1. Mild thrombocytopenia and mild normocytic anemia.   2. Bone marrow biopsy shows 30% cellularity with normal megakaryocytes without  dysplastic changes on biopsy.  5-  3. Parkinson's disease.     REASON FOR VISIT: Thrombocytopenia    HISTORY OF PRESENT ILLNESS:   72-year-old gentleman returns today for follow-up of his thrombocytopenia.  Clinically having issues with his Parkinson's.  It has considerably affected his motor at this point.  He is having difficulty ambulating.  Though he still gets around with a walker.  He presented up in the hospital several times with pneumonia secondary to it.    Past medical history, social history and family history was reviewed and unchanged from prior visit.    Review of Systems:    Review of Systems - Oncology   A comprehensive 14 point review of systems was performed and was negative except as mentioned.      Medications:  The current medication list was reviewed in the EMR    ALLERGIES:    Allergies   Allergen Reactions   • Atorvastatin    • Cefdinir Confusion   • Januvia [Sitagliptin]    • Ramipril          Physical Exam    VITAL SIGNS:  /70 Comment: LUE  Pulse 60  Temp 97.6 °F (36.4 °C) (Temporal Artery )   Resp 20  Ht 71\" (180.3 cm)  Wt 170 lb (77.1 kg)  BMI 23.71 kg/m2     Performance Status: 2    General: well appearing, in no acute distress  HEENT: sclera anicteric, oropharynx clear, neck is supple  Lymphatics: no cervical, supraclavicular, or axillary adenopathy  Cardiovascular: regular rate and rhythm, no murmurs, rubs or gallops  Lungs: clear to auscultation bilaterally  Abdomen: soft, nontender, nondistended.  No palpable organomegaly  Extremities: no lower extremity edema  Skin: no rashes, lesions, bruising, or petechiae  Msk:  Significant gait issues due to tremors and rigidity  Psych: Mood is stable        RECENT LABS:    Lab Results   Component Value Date    WBC 5.15 05/08/2017    HGB 10.8 (L) 05/08/2017    HCT 34.6 (L) " 05/08/2017    MCV 94.3 05/08/2017     (L) 05/08/2017      Lab Results   Component Value Date    GLUCOSE 99 05/08/2017    BUN 21 05/08/2017    CREATININE 0.80 05/08/2017    EGFRIFNONA 96 05/08/2017    BCR 26.3 (H) 05/08/2017    CO2 27.0 05/08/2017    CALCIUM 8.8 05/08/2017    ALBUMIN 3.50 05/08/2017    LABIL2 1.3 (L) 05/08/2017    AST 14 05/08/2017    ALT 7 05/08/2017         Assessment/Plan    72-year-old gentleman with Parkinsonian syndrome.  His anemia and thrombocytopenia relatively stable for the last several years.  This is likely an anemia of chronic disease with a mild thrombocytopenia either related to his medications or may be normal for him.  Regardless he does not need any intervention.  I would not change any of his medicines to affect his platelet count since it's almost close to normal.  I can see him on an as-needed basis at this point.              Rudy Espinoza MD  [facility] Hematology and Oncology    7/3/2017         Please note that portions of this note may have been completed with a voice recognition program. Efforts were made to edit the dictations, but occasionally words are mistranscribed.

## 2017-10-23 ENCOUNTER — TRANSCRIBE ORDERS (OUTPATIENT)
Dept: ADMINISTRATIVE | Facility: HOSPITAL | Age: 72
End: 2017-10-23

## 2017-10-23 ENCOUNTER — HOSPITAL ENCOUNTER (OUTPATIENT)
Dept: GENERAL RADIOLOGY | Facility: HOSPITAL | Age: 72
Discharge: HOME | End: 2017-10-23
Admitting: PHYSICIAN ASSISTANT

## 2017-10-23 DIAGNOSIS — J18.9 PNEUMONIA DUE TO INFECTIOUS ORGANISM, UNSPECIFIED LATERALITY, UNSPECIFIED PART OF LUNG: ICD-10-CM

## 2017-10-23 DIAGNOSIS — R05.9 COUGH: Primary | ICD-10-CM

## 2017-10-23 PROCEDURE — 71020 HC CHEST PA AND LATERAL: CPT

## 2017-10-25 ENCOUNTER — TRANSCRIBE ORDERS (OUTPATIENT)
Dept: ADMINISTRATIVE | Facility: HOSPITAL | Age: 72
End: 2017-10-25

## 2017-10-25 DIAGNOSIS — R29.6 MULTIPLE FALLS: Primary | ICD-10-CM

## 2017-10-25 DIAGNOSIS — S19.80XA TRAUMA OF SOFT TISSUE OF NECK, INITIAL ENCOUNTER: ICD-10-CM

## 2017-10-25 DIAGNOSIS — H57.03: ICD-10-CM

## 2017-10-25 DIAGNOSIS — S09.90XA HEAD TRAUMA, INITIAL ENCOUNTER: ICD-10-CM

## 2017-10-30 ENCOUNTER — HOSPITAL ENCOUNTER (OUTPATIENT)
Dept: CT IMAGING | Facility: HOSPITAL | Age: 72
Discharge: HOME | End: 2017-10-30
Attending: INTERNAL MEDICINE | Admitting: INTERNAL MEDICINE

## 2017-10-30 DIAGNOSIS — H57.03: ICD-10-CM

## 2017-10-30 DIAGNOSIS — R29.6 MULTIPLE FALLS: ICD-10-CM

## 2017-10-30 DIAGNOSIS — S09.90XA HEAD TRAUMA, INITIAL ENCOUNTER: ICD-10-CM

## 2017-10-30 DIAGNOSIS — S19.80XA TRAUMA OF SOFT TISSUE OF NECK, INITIAL ENCOUNTER: ICD-10-CM

## 2017-10-30 PROCEDURE — 70490 CT SOFT TISSUE NECK W/O DYE: CPT

## 2017-10-30 PROCEDURE — 70450 CT HEAD/BRAIN W/O DYE: CPT

## 2017-11-14 ENCOUNTER — LAB REQUISITION (OUTPATIENT)
Dept: LAB | Facility: HOSPITAL | Age: 72
End: 2017-11-14

## 2017-11-14 ENCOUNTER — TRANSCRIBE ORDERS (OUTPATIENT)
Dept: ADMINISTRATIVE | Facility: HOSPITAL | Age: 72
End: 2017-11-14

## 2017-11-14 ENCOUNTER — HOSPITAL ENCOUNTER (OUTPATIENT)
Dept: GENERAL RADIOLOGY | Facility: HOSPITAL | Age: 72
Discharge: HOME | End: 2017-11-14
Admitting: PHYSICIAN ASSISTANT

## 2017-11-14 DIAGNOSIS — Z00.00 ROUTINE GENERAL MEDICAL EXAMINATION AT A HEALTH CARE FACILITY: ICD-10-CM

## 2017-11-14 DIAGNOSIS — R06.02 SOB (SHORTNESS OF BREATH): Primary | ICD-10-CM

## 2017-11-14 DIAGNOSIS — R05.9 COUGH: ICD-10-CM

## 2017-11-14 LAB
ALBUMIN SERPL-MCNC: 3.9 G/DL (ref 3.2–4.8)
ALBUMIN/GLOB SERPL: 1.2 G/DL (ref 1.5–2.5)
ALP SERPL-CCNC: 69 U/L (ref 25–100)
ALT SERPL W P-5'-P-CCNC: 5 U/L (ref 7–40)
ANION GAP SERPL CALCULATED.3IONS-SCNC: 2 MMOL/L (ref 3–11)
AST SERPL-CCNC: 18 U/L (ref 0–33)
BASOPHILS # BLD AUTO: 0.03 10*3/MM3 (ref 0–0.2)
BASOPHILS NFR BLD AUTO: 0.6 % (ref 0–1)
BILIRUB SERPL-MCNC: 0.4 MG/DL (ref 0.3–1.2)
BUN BLD-MCNC: 18 MG/DL (ref 9–23)
BUN/CREAT SERPL: 22.5 (ref 7–25)
CALCIUM SPEC-SCNC: 9.4 MG/DL (ref 8.7–10.4)
CHLORIDE SERPL-SCNC: 108 MMOL/L (ref 99–109)
CO2 SERPL-SCNC: 30 MMOL/L (ref 20–31)
CREAT BLD-MCNC: 0.8 MG/DL (ref 0.6–1.3)
DEPRECATED RDW RBC AUTO: 43.6 FL (ref 37–54)
EOSINOPHIL # BLD AUTO: 0.04 10*3/MM3 (ref 0–0.3)
EOSINOPHIL NFR BLD AUTO: 0.8 % (ref 0–3)
ERYTHROCYTE [DISTWIDTH] IN BLOOD BY AUTOMATED COUNT: 13.6 % (ref 11.3–14.5)
GFR SERPL CREATININE-BSD FRML MDRD: 96 ML/MIN/1.73
GLOBULIN UR ELPH-MCNC: 3.2 GM/DL
GLUCOSE BLD-MCNC: 136 MG/DL (ref 70–100)
HCT VFR BLD AUTO: 36.8 % (ref 38.9–50.9)
HGB BLD-MCNC: 11.7 G/DL (ref 13.1–17.5)
IMM GRANULOCYTES # BLD: 0.01 10*3/MM3 (ref 0–0.03)
IMM GRANULOCYTES NFR BLD: 0.2 % (ref 0–0.6)
LYMPHOCYTES # BLD AUTO: 0.86 10*3/MM3 (ref 0.6–4.8)
LYMPHOCYTES NFR BLD AUTO: 17.5 % (ref 24–44)
MCH RBC QN AUTO: 28.1 PG (ref 27–31)
MCHC RBC AUTO-ENTMCNC: 31.8 G/DL (ref 32–36)
MCV RBC AUTO: 88.2 FL (ref 80–99)
MONOCYTES # BLD AUTO: 0.4 10*3/MM3 (ref 0–1)
MONOCYTES NFR BLD AUTO: 8.1 % (ref 0–12)
NEUTROPHILS # BLD AUTO: 3.58 10*3/MM3 (ref 1.5–8.3)
NEUTROPHILS NFR BLD AUTO: 72.8 % (ref 41–71)
PLATELET # BLD AUTO: 222 10*3/MM3 (ref 150–450)
PMV BLD AUTO: 9.4 FL (ref 6–12)
POTASSIUM BLD-SCNC: 4.5 MMOL/L (ref 3.5–5.5)
PROT SERPL-MCNC: 7.1 G/DL (ref 5.7–8.2)
RBC # BLD AUTO: 4.17 10*6/MM3 (ref 4.2–5.76)
SODIUM BLD-SCNC: 140 MMOL/L (ref 132–146)
WBC NRBC COR # BLD: 4.92 10*3/MM3 (ref 3.5–10.8)

## 2017-11-14 PROCEDURE — 71020 HC CHEST PA AND LATERAL: CPT

## 2017-11-14 PROCEDURE — 85025 COMPLETE CBC W/AUTO DIFF WBC: CPT | Performed by: PHYSICIAN ASSISTANT

## 2017-11-14 PROCEDURE — 80053 COMPREHEN METABOLIC PANEL: CPT | Performed by: PHYSICIAN ASSISTANT

## 2017-11-17 ENCOUNTER — HOSPITAL ENCOUNTER (INPATIENT)
Facility: HOSPITAL | Age: 72
LOS: 4 days | Discharge: HOSPICE | End: 2017-11-22
Attending: EMERGENCY MEDICINE | Admitting: FAMILY MEDICINE

## 2017-11-17 ENCOUNTER — APPOINTMENT (OUTPATIENT)
Dept: CT IMAGING | Facility: HOSPITAL | Age: 72
End: 2017-11-17

## 2017-11-17 DIAGNOSIS — G20 PARKINSON DISEASE (HCC): ICD-10-CM

## 2017-11-17 DIAGNOSIS — R62.7 ADULT FAILURE TO THRIVE SYNDROME: ICD-10-CM

## 2017-11-17 DIAGNOSIS — J69.0 ASPIRATION PNEUMONIA OF RIGHT LOWER LOBE, UNSPECIFIED ASPIRATION PNEUMONIA TYPE (HCC): Primary | ICD-10-CM

## 2017-11-17 LAB
ALBUMIN SERPL-MCNC: 3.8 G/DL (ref 3.2–4.8)
ALBUMIN/GLOB SERPL: 1.1 G/DL (ref 1.5–2.5)
ALP SERPL-CCNC: 60 U/L (ref 25–100)
ALT SERPL W P-5'-P-CCNC: 2 U/L (ref 7–40)
ANION GAP SERPL CALCULATED.3IONS-SCNC: 9 MMOL/L (ref 3–11)
AST SERPL-CCNC: 16 U/L (ref 0–33)
BASOPHILS # BLD AUTO: 0.02 10*3/MM3 (ref 0–0.2)
BASOPHILS NFR BLD AUTO: 0.3 % (ref 0–1)
BILIRUB SERPL-MCNC: 0.4 MG/DL (ref 0.3–1.2)
BNP SERPL-MCNC: 33 PG/ML (ref 0–100)
BUN BLD-MCNC: 27 MG/DL (ref 9–23)
BUN/CREAT SERPL: 27 (ref 7–25)
CALCIUM SPEC-SCNC: 9.5 MG/DL (ref 8.7–10.4)
CHLORIDE SERPL-SCNC: 103 MMOL/L (ref 99–109)
CO2 SERPL-SCNC: 27 MMOL/L (ref 20–31)
CREAT BLD-MCNC: 1 MG/DL (ref 0.6–1.3)
DEPRECATED RDW RBC AUTO: 42.6 FL (ref 37–54)
EOSINOPHIL # BLD AUTO: 0.09 10*3/MM3 (ref 0–0.3)
EOSINOPHIL NFR BLD AUTO: 1.1 % (ref 0–3)
ERYTHROCYTE [DISTWIDTH] IN BLOOD BY AUTOMATED COUNT: 13.4 % (ref 11.3–14.5)
GFR SERPL CREATININE-BSD FRML MDRD: 74 ML/MIN/1.73
GLOBULIN UR ELPH-MCNC: 3.5 GM/DL
GLUCOSE BLD-MCNC: 105 MG/DL (ref 70–100)
HCT VFR BLD AUTO: 35.5 % (ref 38.9–50.9)
HGB BLD-MCNC: 11.5 G/DL (ref 13.1–17.5)
IMM GRANULOCYTES # BLD: 0.03 10*3/MM3 (ref 0–0.03)
IMM GRANULOCYTES NFR BLD: 0.4 % (ref 0–0.6)
LYMPHOCYTES # BLD AUTO: 0.95 10*3/MM3 (ref 0.6–4.8)
LYMPHOCYTES NFR BLD AUTO: 11.9 % (ref 24–44)
MCH RBC QN AUTO: 28 PG (ref 27–31)
MCHC RBC AUTO-ENTMCNC: 32.4 G/DL (ref 32–36)
MCV RBC AUTO: 86.6 FL (ref 80–99)
MONOCYTES # BLD AUTO: 0.78 10*3/MM3 (ref 0–1)
MONOCYTES NFR BLD AUTO: 9.8 % (ref 0–12)
NEUTROPHILS # BLD AUTO: 6.1 10*3/MM3 (ref 1.5–8.3)
NEUTROPHILS NFR BLD AUTO: 76.5 % (ref 41–71)
PLATELET # BLD AUTO: 194 10*3/MM3 (ref 150–450)
PMV BLD AUTO: 9.2 FL (ref 6–12)
POTASSIUM BLD-SCNC: 4 MMOL/L (ref 3.5–5.5)
PROT SERPL-MCNC: 7.3 G/DL (ref 5.7–8.2)
RBC # BLD AUTO: 4.1 10*6/MM3 (ref 4.2–5.76)
SODIUM BLD-SCNC: 139 MMOL/L (ref 132–146)
TROPONIN I SERPL-MCNC: 0 NG/ML (ref 0–0.07)
WBC NRBC COR # BLD: 7.97 10*3/MM3 (ref 3.5–10.8)

## 2017-11-17 PROCEDURE — 93005 ELECTROCARDIOGRAM TRACING: CPT | Performed by: EMERGENCY MEDICINE

## 2017-11-17 PROCEDURE — 83880 ASSAY OF NATRIURETIC PEPTIDE: CPT | Performed by: EMERGENCY MEDICINE

## 2017-11-17 PROCEDURE — 99284 EMERGENCY DEPT VISIT MOD MDM: CPT

## 2017-11-17 PROCEDURE — 94640 AIRWAY INHALATION TREATMENT: CPT

## 2017-11-17 PROCEDURE — 84484 ASSAY OF TROPONIN QUANT: CPT

## 2017-11-17 PROCEDURE — 85025 COMPLETE CBC W/AUTO DIFF WBC: CPT | Performed by: EMERGENCY MEDICINE

## 2017-11-17 PROCEDURE — 25010000002 PIPERACILLIN SOD-TAZOBACTAM PER 1 G: Performed by: EMERGENCY MEDICINE

## 2017-11-17 PROCEDURE — 94799 UNLISTED PULMONARY SVC/PX: CPT

## 2017-11-17 PROCEDURE — 71250 CT THORAX DX C-: CPT

## 2017-11-17 PROCEDURE — 80053 COMPREHEN METABOLIC PANEL: CPT | Performed by: EMERGENCY MEDICINE

## 2017-11-17 PROCEDURE — 25010000002 AZITHROMYCIN: Performed by: EMERGENCY MEDICINE

## 2017-11-17 RX ORDER — QUETIAPINE FUMARATE 25 MG/1
25 TABLET, FILM COATED ORAL NIGHTLY PRN
COMMUNITY

## 2017-11-17 RX ORDER — ERGOCALCIFEROL 1.25 MG/1
50000 CAPSULE ORAL WEEKLY
COMMUNITY

## 2017-11-17 RX ORDER — IPRATROPIUM BROMIDE AND ALBUTEROL SULFATE 2.5; .5 MG/3ML; MG/3ML
3 SOLUTION RESPIRATORY (INHALATION) ONCE
Status: COMPLETED | OUTPATIENT
Start: 2017-11-17 | End: 2017-11-17

## 2017-11-17 RX ADMIN — AZITHROMYCIN 500 MG: 500 INJECTION, POWDER, LYOPHILIZED, FOR SOLUTION INTRAVENOUS at 21:40

## 2017-11-17 RX ADMIN — TAZOBACTAM SODIUM AND PIPERACILLIN SODIUM 4.5 G: 500; 4 INJECTION, SOLUTION INTRAVENOUS at 20:45

## 2017-11-17 RX ADMIN — IPRATROPIUM BROMIDE AND ALBUTEROL SULFATE 3 ML: .5; 3 SOLUTION RESPIRATORY (INHALATION) at 20:11

## 2017-11-17 NOTE — ED PROVIDER NOTES
Subjective   HPI Comments: Mr. Bernabe Irwin is a non-verbal 72 year old male with a hx of advanced Parkinson's Disease who presents to the ED with c/o difficulty breathing. The patient's wife recalls that the patient was eating a piece of apple approximately 2 weeks ago when he began to choke. The patient's wife immediately began abd thrusts, however states that the patient was unable to cough anything up. Since that time, the patient has developed difficulty breathing. The patient was evaluated by his primary care physician, Dr. Warren Robles, who dx him with aspiration PNA and placed him on abx. Since that time, however, the patient has continued to experience worsening SoA, and Dr. Robles referred the patient to the ED for a CT scan of the chest to see if the apple was still present in the lungs. The patient's wife notes that the difficulty breathing has not been accompanied by a cough or vomiting, but notes mild rhinorrhea in the ED.    Patient is a 72 y.o. male presenting with shortness of breath.   History limited by:  Patient nonverbal  Shortness of Breath   Severity:  Moderate  Onset quality:  Gradual  Duration:  2 weeks  Timing:  Constant  Progression:  Worsening  Chronicity:  New  Context: URI    Context comment:  Possible aspiration  Relieved by:  Nothing  Worsened by:  Nothing  Ineffective treatments: abx.  Associated symptoms: no cough and no vomiting    Risk factors: no obesity    Risk factors comment:  Hx of aspiration      Review of Systems   Unable to perform ROS: Patient nonverbal   HENT: Positive for rhinorrhea.    Respiratory: Positive for shortness of breath. Negative for cough.    Gastrointestinal: Negative for blood in stool, constipation, diarrhea and vomiting.   Genitourinary: Negative for decreased urine volume, difficulty urinating, dysuria, frequency, hematuria and urgency.       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 Carbajal outpatient   • Hyperlipidemia    • Myocardial infarction    • Parkinson disease    • PFO (patent foramen ovale)     not on current anticoagulation   • Stroke 2016    right side weakness       Allergies   Allergen Reactions   • Atorvastatin    • Cefdinir Confusion   • Januvia [Sitagliptin]    • Ramipril        Past Surgical History:   Procedure Laterality Date   • APPENDECTOMY     • BONE MARROW BIOPSY     • CARDIAC CATHETERIZATION     • CATARACT EXTRACTION     • INTRAOCULAR LENS INSERTION     • KIDNEY STONE SURGERY     • PROSTATE BIOPSY         Family History   Problem Relation Age of Onset   • Heart disease Mother    • Cancer Father        Social History     Social History   • Marital status:      Spouse name: N/A   • Number of children: N/A   • Years of education: N/A     Social History Main Topics   • Smoking status: Never Smoker   • Smokeless tobacco: None   • Alcohol use No   • Drug use: No   • Sexual activity: Defer     Other Topics Concern   • None     Social History Narrative         Objective   Physical Exam   Constitutional: He is oriented to person, place, and time. He appears well-developed and well-nourished. No distress.   Patient is a thin-appearing male. Patient was alert upon examination, but could not speak. Patient continued to thrust his tongue and had a rattle-like cough, but was in no acute distress.   HENT:   Head: Normocephalic and atraumatic.   Eyes: Conjunctivae are normal. No scleral icterus.   Neck: Normal range of motion. Neck supple.   Cardiovascular: Normal rate, regular rhythm and normal heart sounds.  Exam reveals no gallop and no  friction rub.    No murmur heard.  Pulmonary/Chest: Effort normal. No respiratory distress. He has no wheezes. He has rhonchi. He has no rales.   Rhonchi in right field.   Abdominal: Soft. Bowel sounds are normal. There is no tenderness. There is no guarding.   Musculoskeletal: Normal range of motion.   Neurological: He is alert and oriented to person, place, and time.   Patient is aphasic on my exam. Patient is stiff, with cog-wheeling and vesiculations. This coupled with the tongue thrusting is all consistent with advanced Parkinson's disease.   Skin: Skin is warm and dry. He is not diaphoretic.   Psychiatric: He has a normal mood and affect. His behavior is normal.   Nursing note and vitals reviewed.      Procedures         ED Course  ED Course       Recent Results (from the past 24 hour(s))   Comprehensive Metabolic Panel    Collection Time: 11/17/17  6:46 PM   Result Value Ref Range    Glucose 105 (H) 70 - 100 mg/dL    BUN 27 (H) 9 - 23 mg/dL    Creatinine 1.00 0.60 - 1.30 mg/dL    Sodium 139 132 - 146 mmol/L    Potassium 4.0 3.5 - 5.5 mmol/L    Chloride 103 99 - 109 mmol/L    CO2 27.0 20.0 - 31.0 mmol/L    Calcium 9.5 8.7 - 10.4 mg/dL    Total Protein 7.3 5.7 - 8.2 g/dL    Albumin 3.80 3.20 - 4.80 g/dL    ALT (SGPT) 2 (L) 7 - 40 U/L    AST (SGOT) 16 0 - 33 U/L    Alkaline Phosphatase 60 25 - 100 U/L    Total Bilirubin 0.4 0.3 - 1.2 mg/dL    eGFR Non African Amer 74 >60 mL/min/1.73    Globulin 3.5 gm/dL    A/G Ratio 1.1 (L) 1.5 - 2.5 g/dL    BUN/Creatinine Ratio 27.0 (H) 7.0 - 25.0    Anion Gap 9.0 3.0 - 11.0 mmol/L   BNP    Collection Time: 11/17/17  6:46 PM   Result Value Ref Range    BNP 33.0 0.0 - 100.0 pg/mL   CBC Auto Differential    Collection Time: 11/17/17  6:46 PM   Result Value Ref Range    WBC 7.97 3.50 - 10.80 10*3/mm3    RBC 4.10 (L) 4.20 - 5.76 10*6/mm3    Hemoglobin 11.5 (L) 13.1 - 17.5 g/dL    Hematocrit 35.5 (L) 38.9 - 50.9 %    MCV 86.6 80.0 - 99.0 fL    MCH 28.0 27.0 - 31.0 pg    MCHC 32.4  32.0 - 36.0 g/dL    RDW 13.4 11.3 - 14.5 %    RDW-SD 42.6 37.0 - 54.0 fl    MPV 9.2 6.0 - 12.0 fL    Platelets 194 150 - 450 10*3/mm3    Neutrophil % 76.5 (H) 41.0 - 71.0 %    Lymphocyte % 11.9 (L) 24.0 - 44.0 %    Monocyte % 9.8 0.0 - 12.0 %    Eosinophil % 1.1 0.0 - 3.0 %    Basophil % 0.3 0.0 - 1.0 %    Immature Grans % 0.4 0.0 - 0.6 %    Neutrophils, Absolute 6.10 1.50 - 8.30 10*3/mm3    Lymphocytes, Absolute 0.95 0.60 - 4.80 10*3/mm3    Monocytes, Absolute 0.78 0.00 - 1.00 10*3/mm3    Eosinophils, Absolute 0.09 0.00 - 0.30 10*3/mm3    Basophils, Absolute 0.02 0.00 - 0.20 10*3/mm3    Immature Grans, Absolute 0.03 0.00 - 0.03 10*3/mm3   POC Troponin, Rapid    Collection Time: 11/17/17  6:51 PM   Result Value Ref Range    Troponin I 0.00 0.00 - 0.07 ng/mL     Note: In addition to lab results from this visit, the labs listed above may include labs taken at another facility or during a different encounter within the last 24 hours. Please correlate lab times with ED admission and discharge times for further clarification of the services performed during this visit.    CT Chest Without Contrast   Final Result     Small to moderate RIGHT pleural effusion with RIGHT lung base    atelectasis/consolidation.         Significant chronic coronary arterial calcification suggestive of CAD.        Other nonemergent findings as described.         NOTE: Suboptimal study due to extensive motion artifact.         THIS DOCUMENT HAS BEEN ELECTRONICALLY SIGNED BY ZACH OROZCO MD        Vitals:    11/17/17 1839 11/17/17 1920 11/17/17 2011 11/17/17 2200   BP: 119/63 106/62  107/56   BP Location:       Patient Position:       Pulse: 59 57 60 61   Resp:  18 18    Temp:       TempSrc:       SpO2: 98% 93% 95% 97%   Weight:       Height:         Medications   AZITHROMYCIN 500 MG/250 ML 0.9% NS IVPB (MBP) (500 mg Intravenous New Bag 11/17/17 2782)   ipratropium-albuterol (DUO-NEB) nebulizer solution 3 mL (3 mL Nebulization Given 11/17/17  2011)   piperacillin-tazobactam (ZOSYN) 4.5 g in iso-osmotic dextrose 100 mL IVPB (premix) (0 g Intravenous Stopped 11/17/17 2139)     ECG/EMG Results (last 24 hours)     ** No results found for the last 24 hours. **                      MDM  Number of Diagnoses or Management Options  Adult failure to thrive syndrome:   Aspiration pneumonia of right lower lobe, unspecified aspiration pneumonia type:   Parkinson disease:   Diagnosis management comments:       I reviewed all available studies at the bedside with the patient and his family.  Unfortunately it appears that he has aspiration pneumonia in his right lung.  He had been on antibiotics by mouth since Tuesday of this week by Dr. Robles that instead of showing improvement in his breathing is been getting more labored.  His oxygen saturation here is about 92% on room air and unfortunately due to his Parkinson's  when he coughs he does not have a decent pulmonary toilet.      Apparently at home that has not been following any sort of special diet to help prevent aspirations.  The patient has no desire to have a feeding tube either.    Also he is declined in his physical function now he over the past few days as before he actually help transfer to a wheelchair but he is loss ability to do that in the past 24-48 hours and essentially is become bed bound.    His point I've started him on IV Zosyn and azithromycin I think he needs to be admitted the hospital for continued IV antibiotics also perhaps they have speech therapy evaluate him to see if there is any dietary modifications may perhaps help prevent future aspiration though given his advanced Parkinson's I think this is likely to become more and more of an issue.    I spoke with Dr. Juarez, on-call hospital medicine, and he will admit the patient.    All are agreeable with plan       Amount and/or Complexity of Data Reviewed  Clinical lab tests: reviewed  Tests in the radiology section of CPT®: reviewed  Tests in  the medicine section of CPT®: reviewed  Decide to obtain previous medical records or to obtain history from someone other than the patient: yes        Final diagnoses:   Aspiration pneumonia of right lower lobe, unspecified aspiration pneumonia type   Parkinson disease   Adult failure to thrive syndrome       Documentation assistance provided by kat Davila.  Information recorded by the scribe was done at my direction and has been verified and validated by me.     Rakan Davila  11/17/17 2015       Benji Stevens MD  11/17/17 2138       Benji Stevens MD  11/17/17 0427

## 2017-11-18 PROBLEM — J69.0 ASPIRATION PNEUMONIA OF RIGHT LOWER LOBE (HCC): Status: ACTIVE | Noted: 2017-11-18

## 2017-11-18 LAB
ALBUMIN SERPL-MCNC: 3.7 G/DL (ref 3.2–4.8)
ALBUMIN/GLOB SERPL: 1.1 G/DL (ref 1.5–2.5)
ALP SERPL-CCNC: 59 U/L (ref 25–100)
ALT SERPL W P-5'-P-CCNC: 9 U/L (ref 7–40)
ANION GAP SERPL CALCULATED.3IONS-SCNC: 7 MMOL/L (ref 3–11)
AST SERPL-CCNC: 20 U/L (ref 0–33)
BASOPHILS # BLD AUTO: 0.03 10*3/MM3 (ref 0–0.2)
BASOPHILS NFR BLD AUTO: 0.6 % (ref 0–1)
BILIRUB SERPL-MCNC: 0.6 MG/DL (ref 0.3–1.2)
BUN BLD-MCNC: 21 MG/DL (ref 9–23)
BUN/CREAT SERPL: 23.3 (ref 7–25)
CALCIUM SPEC-SCNC: 9.6 MG/DL (ref 8.7–10.4)
CHLORIDE SERPL-SCNC: 107 MMOL/L (ref 99–109)
CO2 SERPL-SCNC: 25 MMOL/L (ref 20–31)
CREAT BLD-MCNC: 0.9 MG/DL (ref 0.6–1.3)
DEPRECATED RDW RBC AUTO: 44.8 FL (ref 37–54)
EOSINOPHIL # BLD AUTO: 0.15 10*3/MM3 (ref 0–0.3)
EOSINOPHIL NFR BLD AUTO: 3 % (ref 0–3)
ERYTHROCYTE [DISTWIDTH] IN BLOOD BY AUTOMATED COUNT: 13.9 % (ref 11.3–14.5)
GFR SERPL CREATININE-BSD FRML MDRD: 83 ML/MIN/1.73
GLOBULIN UR ELPH-MCNC: 3.4 GM/DL
GLUCOSE BLD-MCNC: 99 MG/DL (ref 70–100)
HCT VFR BLD AUTO: 34.4 % (ref 38.9–50.9)
HGB BLD-MCNC: 10.9 G/DL (ref 13.1–17.5)
IMM GRANULOCYTES # BLD: 0.01 10*3/MM3 (ref 0–0.03)
IMM GRANULOCYTES NFR BLD: 0.2 % (ref 0–0.6)
LYMPHOCYTES # BLD AUTO: 1.02 10*3/MM3 (ref 0.6–4.8)
LYMPHOCYTES NFR BLD AUTO: 20.4 % (ref 24–44)
MCH RBC QN AUTO: 27.7 PG (ref 27–31)
MCHC RBC AUTO-ENTMCNC: 31.7 G/DL (ref 32–36)
MCV RBC AUTO: 87.3 FL (ref 80–99)
MONOCYTES # BLD AUTO: 0.58 10*3/MM3 (ref 0–1)
MONOCYTES NFR BLD AUTO: 11.6 % (ref 0–12)
NEUTROPHILS # BLD AUTO: 3.21 10*3/MM3 (ref 1.5–8.3)
NEUTROPHILS NFR BLD AUTO: 64.2 % (ref 41–71)
PLATELET # BLD AUTO: 193 10*3/MM3 (ref 150–450)
PMV BLD AUTO: 9.6 FL (ref 6–12)
POTASSIUM BLD-SCNC: 3.9 MMOL/L (ref 3.5–5.5)
PROT SERPL-MCNC: 7.1 G/DL (ref 5.7–8.2)
RBC # BLD AUTO: 3.94 10*6/MM3 (ref 4.2–5.76)
SODIUM BLD-SCNC: 139 MMOL/L (ref 132–146)
WBC NRBC COR # BLD: 5 10*3/MM3 (ref 3.5–10.8)

## 2017-11-18 PROCEDURE — 25010000002 AZITHROMYCIN: Performed by: FAMILY MEDICINE

## 2017-11-18 PROCEDURE — 25010000002 HEPARIN (PORCINE) PER 1000 UNITS: Performed by: HOSPITALIST

## 2017-11-18 PROCEDURE — 25010000002 PIPERACILLIN SOD-TAZOBACTAM PER 1 G: Performed by: FAMILY MEDICINE

## 2017-11-18 PROCEDURE — 85025 COMPLETE CBC W/AUTO DIFF WBC: CPT | Performed by: FAMILY MEDICINE

## 2017-11-18 PROCEDURE — 99233 SBSQ HOSP IP/OBS HIGH 50: CPT | Performed by: HOSPITALIST

## 2017-11-18 PROCEDURE — 80053 COMPREHEN METABOLIC PANEL: CPT | Performed by: FAMILY MEDICINE

## 2017-11-18 PROCEDURE — 99222 1ST HOSP IP/OBS MODERATE 55: CPT | Performed by: FAMILY MEDICINE

## 2017-11-18 RX ORDER — CARBIDOPA AND LEVODOPA 50; 200 MG/1; MG/1
1 TABLET, EXTENDED RELEASE ORAL 3 TIMES DAILY
Status: DISCONTINUED | OUTPATIENT
Start: 2017-11-18 | End: 2017-11-22 | Stop reason: HOSPADM

## 2017-11-18 RX ORDER — ENTACAPONE 200 MG/1
200 TABLET ORAL
Status: DISCONTINUED | OUTPATIENT
Start: 2017-11-18 | End: 2017-11-18

## 2017-11-18 RX ORDER — LANOLIN ALCOHOL/MO/W.PET/CERES
500 CREAM (GRAM) TOPICAL DAILY
Status: DISCONTINUED | OUTPATIENT
Start: 2017-11-18 | End: 2017-11-22 | Stop reason: HOSPADM

## 2017-11-18 RX ORDER — ASPIRIN 81 MG/1
81 TABLET, CHEWABLE ORAL DAILY
Status: DISCONTINUED | OUTPATIENT
Start: 2017-11-18 | End: 2017-11-22 | Stop reason: HOSPADM

## 2017-11-18 RX ORDER — ENTACAPONE 200 MG/1
200 TABLET ORAL 3 TIMES DAILY
COMMUNITY

## 2017-11-18 RX ORDER — ENTACAPONE 200 MG/1
200 TABLET ORAL 3 TIMES DAILY
Status: DISCONTINUED | OUTPATIENT
Start: 2017-11-18 | End: 2017-11-22 | Stop reason: HOSPADM

## 2017-11-18 RX ORDER — PRAMIPEXOLE DIHYDROCHLORIDE 0.25 MG/1
0.75 TABLET ORAL 3 TIMES DAILY
Status: DISCONTINUED | OUTPATIENT
Start: 2017-11-18 | End: 2017-11-22 | Stop reason: HOSPADM

## 2017-11-18 RX ORDER — CLOPIDOGREL BISULFATE 75 MG/1
75 TABLET ORAL DAILY
Status: DISCONTINUED | OUTPATIENT
Start: 2017-11-18 | End: 2017-11-22 | Stop reason: HOSPADM

## 2017-11-18 RX ORDER — CARBIDOPA AND LEVODOPA 50; 200 MG/1; MG/1
1 TABLET, EXTENDED RELEASE ORAL 3 TIMES DAILY
COMMUNITY

## 2017-11-18 RX ORDER — DONEPEZIL HYDROCHLORIDE 10 MG/1
20 TABLET, FILM COATED ORAL NIGHTLY
Status: DISCONTINUED | OUTPATIENT
Start: 2017-11-18 | End: 2017-11-22 | Stop reason: HOSPADM

## 2017-11-18 RX ORDER — QUETIAPINE FUMARATE 25 MG/1
25 TABLET, FILM COATED ORAL NIGHTLY
Status: DISCONTINUED | OUTPATIENT
Start: 2017-11-18 | End: 2017-11-22 | Stop reason: HOSPADM

## 2017-11-18 RX ORDER — CARBIDOPA AND LEVODOPA 50; 200 MG/1; MG/1
1 TABLET, EXTENDED RELEASE ORAL EVERY 6 HOURS SCHEDULED
Status: DISCONTINUED | OUTPATIENT
Start: 2017-11-18 | End: 2017-11-18

## 2017-11-18 RX ORDER — HEPARIN SODIUM 5000 [USP'U]/ML
5000 INJECTION, SOLUTION INTRAVENOUS; SUBCUTANEOUS EVERY 8 HOURS SCHEDULED
Status: DISCONTINUED | OUTPATIENT
Start: 2017-11-18 | End: 2017-11-22 | Stop reason: HOSPADM

## 2017-11-18 RX ADMIN — CYANOCOBALAMIN TAB 1000 MCG 500 MCG: 1000 TAB at 08:22

## 2017-11-18 RX ADMIN — TAZOBACTAM SODIUM AND PIPERACILLIN SODIUM 4.5 G: 500; 4 INJECTION, SOLUTION INTRAVENOUS at 12:25

## 2017-11-18 RX ADMIN — PRAMIPEXOLE DIHYDROCHLORIDE 0.75 MG: 0.25 TABLET ORAL at 16:15

## 2017-11-18 RX ADMIN — QUETIAPINE FUMARATE 25 MG: 25 TABLET ORAL at 21:50

## 2017-11-18 RX ADMIN — CARBIDOPA AND LEVODOPA 1 TABLET: 50; 200 TABLET, EXTENDED RELEASE ORAL at 06:46

## 2017-11-18 RX ADMIN — ENTACAPONE 200 MG: 200 TABLET, FILM COATED ORAL at 17:34

## 2017-11-18 RX ADMIN — HEPARIN SODIUM 5000 UNITS: 5000 INJECTION, SOLUTION INTRAVENOUS; SUBCUTANEOUS at 14:55

## 2017-11-18 RX ADMIN — CLOPIDOGREL BISULFATE 75 MG: 75 TABLET ORAL at 08:22

## 2017-11-18 RX ADMIN — SERTRALINE HYDROCHLORIDE 50 MG: 50 TABLET ORAL at 08:22

## 2017-11-18 RX ADMIN — CARBIDOPA AND LEVODOPA 1 TABLET: 50; 200 TABLET, EXTENDED RELEASE ORAL at 12:25

## 2017-11-18 RX ADMIN — AZITHROMYCIN 500 MG: 500 INJECTION, POWDER, LYOPHILIZED, FOR SOLUTION INTRAVENOUS at 21:49

## 2017-11-18 RX ADMIN — TAZOBACTAM SODIUM AND PIPERACILLIN SODIUM 4.5 G: 500; 4 INJECTION, SOLUTION INTRAVENOUS at 03:44

## 2017-11-18 RX ADMIN — ASPIRIN 81 MG CHEWABLE TABLET 81 MG: 81 TABLET CHEWABLE at 08:22

## 2017-11-18 RX ADMIN — TAZOBACTAM SODIUM AND PIPERACILLIN SODIUM 4.5 G: 500; 4 INJECTION, SOLUTION INTRAVENOUS at 20:25

## 2017-11-18 RX ADMIN — ENTACAPONE 200 MG: 200 TABLET, FILM COATED ORAL at 12:25

## 2017-11-18 RX ADMIN — DONEPEZIL HYDROCHLORIDE 20 MG: 10 TABLET, FILM COATED ORAL at 21:50

## 2017-11-18 RX ADMIN — ENTACAPONE 200 MG: 200 TABLET, FILM COATED ORAL at 06:46

## 2017-11-18 RX ADMIN — CARBIDOPA AND LEVODOPA 1 TABLET: 50; 200 TABLET, EXTENDED RELEASE ORAL at 17:33

## 2017-11-18 RX ADMIN — HEPARIN SODIUM 5000 UNITS: 5000 INJECTION, SOLUTION INTRAVENOUS; SUBCUTANEOUS at 21:50

## 2017-11-18 NOTE — PROGRESS NOTES
Adult Nutrition  Assessment/PES    Patient Name:  Bernabe Irwin  YOB: 1945  MRN: [id number]  Admit Date:  11/17/2017    Assessment Date:  11/18/2017    Comments:  RD completed initial nutrition assessment r/t MD consult r/t MST >2. Comfort diet ordered per family wishes. Hospice consult pending. RD to continue to follow.     Additional Recommendations:  1. NFPE not appropriate for patients current clinical status          Reason for Assessment       11/18/17 1108    Reason for Assessment    Reason For Assessment/Visit identified at risk by screening criteria    Identified At Risk By Screening Criteria MST SCORE 2+;unintentional loss of 10 lbs or more in the past 2 mos;other (see comments)   unsure wt loss, poor po r/t poor appetite    Time Spent (min) 30    Diagnosis Diagnosis    Endocrine DM    Hematological Chronic anemia    Neurological Parkinson's;Dementia    Pulmonary/Critical Care Pneumonia   Asp PNA, effusions    Skin Pressure ulcer   Stage I   Pressure Ulcer 11/18/17 0240 Left other (see comments) Stage I  less than 1 day      Pressure Ulcer 11/18/17 0241 Left lateral other (see comments) Stage I less than 1 day       Pressure Ulcer 11/18/17 0242 Left medial  Stage I less than 1 day       Pressure Ulcer 11/18/17 0242 Right  Stage I less than 1 day       Pressure Ulcer 11/18/17 0243 Right lateral  Stage I less than 1 day          Other diagnosis Chronic dysphagia   Hospital Problem List  Principal Problem:    Aspiration pneumonia of right lower lobe  Active Problems:    Parkinson disease    Dementia    Diabetes mellitus    Anxiety    GERD (gastroesophageal reflux disease)    Dysphagia, Probable               Nutrition/Diet History       11/18/17 1116    Nutrition/Diet History    Factors Affecting Nutritional Intake Factors    Reported/Observed By RN;Patient    Other Pts wife states pt has lost a significant amount of weight in the past 6 months but that he has been very well, she is unsure of  "etiology of wt loss. Pt has had multiple adm which indicate dysphagia present. Pts wife again states she does not want tubefeeding or modified diet at this time, she states she does not want swallow evaluation, she is agreeable to comfort diet and states she is aware of the risks.  Hospice consult pending. Pt agitated at time of RD visit, NFPE not appropriate. RD spoke with pts RN via phone who agreed comfort diet is appropriate for current POC/GOC.             Anthropometrics       11/18/17 1126    Anthropometrics (Special Considerations)    Height Used for Calculations 1.803 m (5' 11\")    Weight Used for Calculations 60.8 kg (134 lb)   bedscale 11/18    RD Calculated BMI (kg/m2) 18.73    Usual Body Weight (UBW)    Weight Loss 11.8 kg (26 lb)    % Weight Loss  26 %    Weight Loss Time Frame 6 months    Body Mass Index (BMI)    BMI Grade 17 - 19 low grade I            Labs/Tests/Procedures/Meds       11/18/17 1127    Labs/Tests/Procedures/Meds    Labs/Tests Review Reviewed    Medication Review Reviewed, pertinent   B12, ABX     Results from last 7 days  Lab Units 11/18/17  0603   SODIUM mmol/L 139   POTASSIUM mmol/L 3.9   CHLORIDE mmol/L 107   CO2 mmol/L 25.0   BUN mg/dL 21   CREATININE mg/dL 0.90   CALCIUM mg/dL 9.6   BILIRUBIN mg/dL 0.6   ALK PHOS U/L 59   ALT (SGPT) U/L 9   AST (SGOT) U/L 20   GLUCOSE mg/dL 99       Intake & Output (last day)       11/17 0701 - 11/18 0700 11/18 0701 - 11/19 0700    IV Piggyback 450     Total Intake(mL/kg) 450 (7.4)     Net +450            Unmeasured Urine Occurrence 1 x                    Nutrition Prescription Ordered       11/18/17 1128    Nutrition Prescription PO    Current PO Diet Other (comment)   No order in place              Problem/Interventions:        Problem 1       11/18/17 1128    Nutrition Diagnoses Problem 1    Problem 1 Unintended Weight Loss   Siginificant     Etiology (related to) --   ?etiology    Signs/Symptoms (evidenced by) Unintended Weight Change    " Unintended Weight Change Loss    Number of Pounds Lost 26lb   16%    Weight loss time period 6 months                    Intervention Goal       11/18/17 1129    Intervention Goal    General --   Hospice/palliative consult pending    PO Initiate feeding   comfort diet    Weight No significant weight loss            Nutrition Intervention       11/18/17 1131    Nutrition Intervention    RD/Tech Action Follow Tx progress;Care plan reviewd;Menu adjusted            Nutrition Prescription       11/18/17 1131    Nutrition Prescription PO    PO Prescription Begin/change diet    Begin/Change Diet to Regular    New PO Prescription Ordered? Yes            Education/Evaluation       11/18/17 1132    Monitor/Evaluation    Monitor Per protocol;Other (comment)   POC/GOC        Electronically signed by:  Beulah Solorio RDN, ALYSSA  11/18/17 11:33 AM

## 2017-11-18 NOTE — CONSULTS
"Palliative Care Initial Consult Note    Patient Name:  Bernabe Irwin   : 1945   Sex: male    Patient Care Team:  Warren Robles MD as PCP - General  Warren Robles MD as PCP - Family Medicine    Advance care planning discussed: yes  Code Status: Conditional  Advance Directive: yes  Surrogate decision maker: Wife, Nina Irwin, [phone]    Reason for Consult: goals of care    Subjective     Patient is a 72 y.o. male presents with fever. Admitted for aspiration pna RLL. Second hospitalization this year (May 2017). Chronic, persistent, worsening dysphagia.    PMHx Parkinson's dementia.    Last hospitalized in May 2017 -- discharged to [facility] then [facility] - has been home since 2017. Pt/wife do not want to pursue therapy or any rehabilitation services upon discharge.    Patient's response when asked, \"are you uncomfortable because of pain?\": No    Review of Systems  Review of Systems   Constitutional: Positive for activity change, appetite change, fatigue, fever and unexpected weight change.   HENT: Positive for trouble swallowing.        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 Carbajal outpatient   • Hyperlipidemia    • Myocardial infarction    • Parkinson disease    • PFO (patent foramen ovale)     not on current anticoagulation   • Stroke 2016    right side weakness     Past Surgical History:   Procedure Laterality Date   • APPENDECTOMY     • BONE MARROW BIOPSY     • CARDIAC CATHETERIZATION     • CATARACT EXTRACTION     • INTRAOCULAR LENS INSERTION     • KIDNEY STONE SURGERY   "   • PROSTATE BIOPSY       Current Facility-Administered Medications   Medication Dose Route Frequency Provider Last Rate Last Dose   • aspirin chewable tablet 81 mg  81 mg Oral Daily Saleem D Juarez, DO   81 mg at 11/18/17 0822   • AZITHROMYCIN 500 MG/250 ML 0.9% NS IVPB (MBP)  500 mg Intravenous Q24H Saleem D Juarez, DO       • carbidopa-levodopa CR (SINEMET CR)  MG per CR tablet 1 tablet  1 tablet Oral Q6H Vernon D Brewer, DO   1 tablet at 11/18/17 0646   • clopidogrel (PLAVIX) tablet 75 mg  75 mg Oral Daily Saleem D Juarez, DO   75 mg at 11/18/17 0822   • donepezil (ARICEPT) tablet 20 mg  20 mg Oral Nightly Saleem D Juarez, DO       • entacapone (COMTAN) tablet 200 mg  200 mg Oral 5x Daily Saleem D Juarez, DO   200 mg at 11/18/17 0646   • piperacillin-tazobactam (ZOSYN) 4.5 g in iso-osmotic dextrose 100 mL IVPB (premix)  4.5 g Intravenous Q8H Vernon D Brewer, DO   Stopped at 11/18/17 0414   • QUEtiapine (SEROquel) tablet 25 mg  25 mg Oral Nightly Saleem D Brewer, DO       • sertraline (ZOLOFT) tablet 50 mg  50 mg Oral Daily Saleem D Brewer, DO   50 mg at 11/18/17 0822   • vitamin B-12 (CYANOCOBALAMIN) tablet 500 mcg  500 mcg Oral Daily Vernon D Brewer, DO   500 mcg at 11/18/17 0822          Allergies   Allergen Reactions   • Atorvastatin    • Cefdinir Confusion   • Januvia [Sitagliptin]    • Ramipril      Family History   Problem Relation Age of Onset   • Heart disease Mother    • Cancer Father      Social History     Social History   • Marital status:      Spouse name: N/A   • Number of children: N/A   • Years of education: N/A     Occupational History   • Not on file.     Social History Main Topics   • Smoking status: Never Smoker   • Smokeless tobacco: Not on file   • Alcohol use No   • Drug use: No   • Sexual activity: Defer     Other Topics Concern   • Not on file     Social History Narrative       Objective     Vital Signs  Temp:  [97.3 °F (36.3 °C)-100.9 °F (38.3 °C)] 100.9 °F (38.3 °C)  Heart  Rate:  [57-66] 65  Resp:  [18-22] 22  BP: ()/(51-82) 126/74    PPS: Palliative Performance Scale score as of 11/18/2017, 11:31 AM is 20% based on the following measures:   Ambulation: Totally bed bound  Activity and Evidence of Disease: Unable to do any work, extensive evidence of disease  Self-Care: Total care  Intake:Minimal sips  LOC: Full, drowsy or confusion    FAST stage 7c    Physical Exam:  Physical Exam   Constitutional: No distress.   Thin, frail   Eyes: EOM are normal.   Neck: No tracheal deviation present.   Cardiovascular: Normal rate and regular rhythm.  Exam reveals no gallop and no friction rub.    No murmur heard.  Pulmonary/Chest: Effort normal and breath sounds normal. No respiratory distress. He has no wheezes. He has no rales.   CTA surpisingly   Abdominal: Soft. Bowel sounds are normal. He exhibits no distension. There is no tenderness.   Musculoskeletal: He exhibits no edema.   Neurological: He is alert.   Skin: Skin is dry. He is not diaphoretic. There is pallor.   Psychiatric:   Unable to assess       Results Review:   Lab Results   Component Value Date    HGBA1C 5.30 07/30/2016       Lab Results   Component Value Date    GLUCOSE 99 11/18/2017    BUN 21 11/18/2017    CREATININE 0.90 11/18/2017    EGFRIFNONA 83 11/18/2017    BCR 23.3 11/18/2017    K 3.9 11/18/2017    CO2 25.0 11/18/2017    CALCIUM 9.6 11/18/2017    ALBUMIN 3.70 11/18/2017    LABIL2 1.1 (L) 11/18/2017    AST 20 11/18/2017    ALT 9 11/18/2017       WBC   Date Value Ref Range Status   11/18/2017 5.00 3.50 - 10.80 10*3/mm3 Final     RBC   Date Value Ref Range Status   11/18/2017 3.94 (L) 4.20 - 5.76 10*6/mm3 Final     Hemoglobin   Date Value Ref Range Status   11/18/2017 10.9 (L) 13.1 - 17.5 g/dL Final     Hematocrit   Date Value Ref Range Status   11/18/2017 34.4 (L) 38.9 - 50.9 % Final     MCV   Date Value Ref Range Status   11/18/2017 87.3 80.0 - 99.0 fL Final     MCH   Date Value Ref Range Status   11/18/2017 27.7 27.0  "- 31.0 pg Final     MCHC   Date Value Ref Range Status   11/18/2017 31.7 (L) 32.0 - 36.0 g/dL Final     RDW   Date Value Ref Range Status   11/18/2017 13.9 11.3 - 14.5 % Final     RDW-SD   Date Value Ref Range Status   11/18/2017 44.8 37.0 - 54.0 fl Final     MPV   Date Value Ref Range Status   11/18/2017 9.6 6.0 - 12.0 fL Final     Platelets   Date Value Ref Range Status   11/18/2017 193 150 - 450 10*3/mm3 Final     Neutrophil %   Date Value Ref Range Status   11/18/2017 64.2 41.0 - 71.0 % Final     Lymphocyte %   Date Value Ref Range Status   11/18/2017 20.4 (L) 24.0 - 44.0 % Final     Monocyte %   Date Value Ref Range Status   11/18/2017 11.6 0.0 - 12.0 % Final     Eosinophil %   Date Value Ref Range Status   11/18/2017 3.0 0.0 - 3.0 % Final     Basophil %   Date Value Ref Range Status   11/18/2017 0.6 0.0 - 1.0 % Final     Immature Grans %   Date Value Ref Range Status   11/18/2017 0.2 0.0 - 0.6 % Final     Neutrophils, Absolute   Date Value Ref Range Status   11/18/2017 3.21 1.50 - 8.30 10*3/mm3 Final     Lymphocytes, Absolute   Date Value Ref Range Status   11/18/2017 1.02 0.60 - 4.80 10*3/mm3 Final     Monocytes, Absolute   Date Value Ref Range Status   11/18/2017 0.58 0.00 - 1.00 10*3/mm3 Final     Eosinophils, Absolute   Date Value Ref Range Status   11/18/2017 0.15 0.00 - 0.30 10*3/mm3 Final     Basophils, Absolute   Date Value Ref Range Status   11/18/2017 0.03 0.00 - 0.20 10*3/mm3 Final     Immature Grans, Absolute   Date Value Ref Range Status   11/18/2017 0.01 0.00 - 0.03 10*3/mm3 Final     Active Problems:    Aspiration pneumonia of right lower lobe      Assessment/Plan   Assessment/Plan:   Dysphagia  Cough  Fever    Discussion with pt/wife. She does all of the talking for pt; pt will answer yes/no to questions. He \"never complains\" unless asked specifically about a complaint. Wife understands with pt \"aspirating all the time\" that pna is \"inevitable.\" Discussed plan of care appreciating pt's comfort " "diet and persistent aspiration. Information given on Palliative Care at Home (since pt is \"home limited\" he will qualify for home palliative care.) vs home with Hospice. Wife works; they have a daughter. Wife affirmed Advanced Directives - NO CPR, NO INTUBATION, NO TUBE FEEDING. Continue with current of care for now (abx). Palliative will continue to follow and support pt/family.    Comfort diet - no need for speech consult at this time as pt has a preferred diet     Pt is mainly bedbound - unable to leave house for doctor's appts -will provide information on Home Primary Care too.     Total Visit Time: 55 minutes  Face to Face Time: 35 minutes    ANGEL Nunez  (C) [phone] (O) [phone]  11/18/17  9:35 AM  "

## 2017-11-18 NOTE — H&P
Patient Care Team:  Warren Robles MD as PCP - General  Warren Robles MD as PCP - Family Medicine    Chief complaint Fever    Subjective     Patient is a 72 y.o. male presents with fever.  Patient has a history of Parkinson's dementia.  Wife states that about a week ago the patient was eating an apple when he had episode of choking.  The wife states that she was able to hit him on his back and was able to dislodge the piece of apple.  Apparently over the last week the patient has beginning progressively tired and sleeping more than normal.  Patient's wife states that he is also been having increasing episodes of coughing whenever he tries to drink water.  Wife took the patient to see his primary care doctor earlier today and was sent to the ER for further evaluation due to concerns for possible aspiration pneumonia.    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 Carbajal outpatient   • Hyperlipidemia    • Myocardial infarction    • Parkinson disease    • PFO (patent foramen ovale)     not on current anticoagulation   • Stroke 2016    right side weakness     Past Surgical History:   Procedure Laterality Date   • APPENDECTOMY     • BONE MARROW BIOPSY     • CARDIAC CATHETERIZATION     • CATARACT EXTRACTION     • INTRAOCULAR LENS INSERTION     • KIDNEY STONE SURGERY     • PROSTATE BIOPSY       No current facility-administered medications for this encounter.      Current Outpatient  Prescriptions   Medication Sig Dispense Refill   • aspirin 81 MG chewable tablet Chew 1 tablet daily.     • Calcium Carbonate (CALTRATE 600 PO) Take  by mouth.     • carbidopa-levodopa CR (SINEMET CR)  MG per CR tablet Take 1 tablet by mouth Every 6 (Six) Hours. 18 tablet 0   • clopidogrel (PLAVIX) 75 MG tablet Take 75 mg by mouth daily.     • donepezil (ARICEPT) 23 MG tablet TAKE 1 TABLET BY MOUTH DAILY  5   • entacapone (COMTAN) 200 MG tablet Take 1 tablet by mouth 5 (Five) Times a Day.     • nitrofurantoin, macrocrystal-monohydrate, (MACROBID) 100 MG capsule TAKE 1 CAPSULE BY MOUTH ONE TIME A DAY  0   • pravastatin (PRAVACHOL) 80 MG tablet Take 80 mg by mouth daily.     • QUEtiapine (SEROquel) 25 MG tablet Take 25 mg by mouth Every Night.     • sertraline (ZOLOFT) 50 MG tablet TAKE ONE TABLET BY MOUTH EVERY DAY AS DIREDTED  5   • vitamin B-12 (CYANOCOBALAMIN) 500 MCG tablet Take 500 mcg by mouth Daily.     • vitamin D (ERGOCALCIFEROL) 76165 units capsule capsule Take 50,000 Units by mouth 1 (One) Time Per Week.     • albuterol ER (VOSPIRE ER) 4 MG 12 hr tablet Take 4 mg by mouth Every 12 (Twelve) Hours.     • calcium carbonate (TUMS) 500 MG chewable tablet Chew 1 tablet Daily.     • Cholecalciferol (VITAMIN D3) 5000 UNITS capsule capsule Take 5,000 Units by mouth Daily.     • docusate sodium (COLACE) 100 MG capsule Take 100 mg by mouth 2 (Two) Times a Day.     • guaiFENesin (MUCINEX) 600 MG 12 hr tablet Take 1,200 mg by mouth 2 (Two) Times a Day.     • nitrofurantoin (MACRODANTIN) 100 MG capsule Take 100 mg by mouth 4 (Four) Times a Day.     • pramipexole (MIRAPEX) 1.5 MG tablet Take 1.5 mg by mouth 3 (Three) Times a Day.         No current facility-administered medications for this encounter.     Allergies   Allergen Reactions   • Atorvastatin    • Cefdinir Confusion   • Januvia [Sitagliptin]    • Ramipril      Family History   Problem Relation Age of Onset   • Heart disease Mother    • Cancer Father       Social History     Social History   • Marital status:      Spouse name: N/A   • Number of children: N/A   • Years of education: N/A     Occupational History   • Not on file.     Social History Main Topics   • Smoking status: Never Smoker   • Smokeless tobacco: Not on file   • Alcohol use No   • Drug use: No   • Sexual activity: Defer     Other Topics Concern   • Not on file     Social History Narrative       Objective     Vital Signs  Temp:  [97.3 °F (36.3 °C)] 97.3 °F (36.3 °C)  Heart Rate:  [57-66] 61  Resp:  [18-22] 18  BP: ()/(51-82) 114/64    Physical Exam:      General Appearance:    Awake, mumbles mostly incoherently    Head:    Normocephalic, without obvious abnormality, atraumatic   Eyes:            Lids and lashes normal, conjunctivae and sclerae normal, no   icterus, no pallor, corneas clear, PERRLA   Ears:    Ears appear intact with no abnormalities noted   Throat:   No oral lesions, no thrush, oral mucosa moist   Neck:   No adenopathy, supple, trachea midline, no thyromegaly, no   carotid bruit, no JVD   Back:     No kyphosis present, no scoliosis present, no skin lesions,      erythema or scars, no tenderness to percussion or                   palpation,   range of motion normal   Lungs:     Clear to auscultation,respirations regular, even and                  unlabored    Heart:    Regular rhythm and normal rate, normal S1 and S2, no            murmur, no gallop, no rub, no click   Chest Wall:    No abnormalities observed   Abdomen:     Normal bowel sounds, no masses, no organomegaly, soft        non-tender, non-distended, no guarding, no rebound                tenderness   Rectal:     Deferred   Extremities:   Moves all extremities well, no edema, no cyanosis, no             redness   Pulses:   Pulses palpable and equal bilaterally   Skin:   No bleeding, bruising or rash   Lymph nodes:   No palpable adenopathy   Neurologic:   Cranial nerves 2 - 12 grossly intact, sensation intact,  DTR       present and equal bilaterally         Results Review:    I reviewed the patient's new clinical results.  Assessment/Plan     Active Problems:    Aspiration pneumonia of right lower lobe  Parkinson's dementia  Dysphagia      I did have a discussion with the patient's wife as well as the patient's daughter who are both at bedside.  We did discuss the overall goals of care as well as discussing the CODE STATUS.  They both agree that they want the patient to be a DNR.  We did don't to discussed the fact that the patient is apparently having overt dysphagia which is getting progressively worse.  Family is adamant that they do not want a feeding tube.  We are going go ahead and continue to patient on IV antibiotics at this point time.  I did discuss with them about getting speech involved, however they state that the last time they went home they said that they would not do a modified diet.  I did go on to discuss with film about overall plan is the patient will be high risk for future hospitalizations.  Family is interested in talking the palliative as well as hospice and I will go ahead and consult both.    I discussed the patients findings and my recommendations with patient and family.     Saleem Juarez,   11/18/17  12:20 AM

## 2017-11-18 NOTE — PLAN OF CARE
Problem: Patient Care Overview (Adult)  Goal: Plan of Care Review  Outcome: Ongoing (interventions implemented as appropriate)    11/18/17 1205   Coping/Psychosocial Response Interventions   Plan Of Care Reviewed With patient;spouse   Patient Care Overview   Progress no change   Outcome Evaluation   Outcome Summary/Follow up Plan VSS. NSR on monitor.seen per WOC. consult for hospice.          Problem: Pneumonia (Adult)  Goal: Signs and Symptoms of Listed Potential Problems Will be Absent or Manageable (Pneumonia)  Outcome: Ongoing (interventions implemented as appropriate)    Problem: Fall Risk (Adult)  Goal: Identify Related Risk Factors and Signs and Symptoms  Outcome: Ongoing (interventions implemented as appropriate)  Goal: Absence of Falls  Outcome: Ongoing (interventions implemented as appropriate)    Problem: Pressure Ulcer (Adult)  Goal: Signs and Symptoms of Listed Potential Problems Will be Absent or Manageable (Pressure Ulcer)  Outcome: Ongoing (interventions implemented as appropriate)

## 2017-11-18 NOTE — PLAN OF CARE
Problem: Patient Care Overview (Adult)  Goal: Plan of Care Review  Outcome: Ongoing (interventions implemented as appropriate)    11/18/17 0559   Coping/Psychosocial Response Interventions   Plan Of Care Reviewed With patient;spouse   Patient Care Overview   Progress progress toward functional goals as expected   Outcome Evaluation   Outcome Summary/Follow up Plan Pt arrived from ED. Resting comfortably. VSS. Family requesting food for pt, aware of aspiration risks. Refused pneumonia dysphagia screen. NSR.       Goal: Adult Individualization and Mutuality  Outcome: Ongoing (interventions implemented as appropriate)  Goal: Discharge Needs Assessment  Outcome: Ongoing (interventions implemented as appropriate)    Problem: Pneumonia (Adult)  Goal: Signs and Symptoms of Listed Potential Problems Will be Absent or Manageable (Pneumonia)  Outcome: Ongoing (interventions implemented as appropriate)    Problem: Fall Risk (Adult)  Goal: Identify Related Risk Factors and Signs and Symptoms  Outcome: Ongoing (interventions implemented as appropriate)  Goal: Absence of Falls  Outcome: Ongoing (interventions implemented as appropriate)    Problem: Pressure Ulcer (Adult)  Goal: Signs and Symptoms of Listed Potential Problems Will be Absent or Manageable (Pressure Ulcer)  Outcome: Ongoing (interventions implemented as appropriate)

## 2017-11-18 NOTE — PROGRESS NOTES
[facility] Medicine Services  PROGRESS NOTE    Patient Name: Bernabe Irwin  : 1945  MRN: [id number]    Date of Admission: 2017  Length of Stay: 1  Primary Care Physician: Warren Robles MD    Subjective   Subjective     CC:  Dyspnea    HPI:  He's lethargic/agitated/jerking. The wife at bedside states it's sleep deprivation. Denies dyspnea    Review of Systems  Unable to assess  Otherwise ROS is negative except as mentioned in the HPI.    Objective   Objective     Vital Signs:   Temp:  [97.3 °F (36.3 °C)-100.9 °F (38.3 °C)] 100.9 °F (38.3 °C)  Heart Rate:  [57-66] 65  Resp:  [18-22] 22  BP: ()/(51-82) 126/74        Physical Exam:  Lethargic but arouses, mumbling, jerking spells  OP clear/dry  Neck supple  RRR  Decreased on R, markedly worse than L  +BS, ND, NT  LYONS  No rashes  Thin      Results Reviewed:  I have personally reviewed current lab, radiology, and data and agree.      Results from last 7 days  Lab Units 17  0603 17  1846 17  1606   WBC 10*3/mm3 5.00 7.97 4.92   HEMOGLOBIN g/dL 10.9* 11.5* 11.7*   HEMATOCRIT % 34.4* 35.5* 36.8*   PLATELETS 10*3/mm3 193 194 222       Results from last 7 days  Lab Units 17  0603 17  1846 17  1606   SODIUM mmol/L 139 139 140   POTASSIUM mmol/L 3.9 4.0 4.5   CHLORIDE mmol/L 107 103 108   CO2 mmol/L 25.0 27.0 30.0   BUN mg/dL 21 27* 18   CREATININE mg/dL 0.90 1.00 0.80   GLUCOSE mg/dL 99 105* 136*   CALCIUM mg/dL 9.6 9.5 9.4   ALT (SGPT) U/L 9 2* 5*   AST (SGOT) U/L 20 16 18     BNP   Date Value Ref Range Status   2017 33.0 0.0 - 100.0 pg/mL Final     No results found for: PHART    Microbiology Results Abnormal     None          Imaging Results (last 24 hours)     Procedure Component Value Units Date/Time    CT Chest Without Contrast [[id number]] Collected:  17     Updated:  17    Narrative:       EXAM:    CT Chest Without Intravenous  Contrast    CLINICAL HISTORY:     72 years old, male; Signs and symptoms; Other: Choking; Additional info:   Choking, possible aspiration on apple    TECHNIQUE:    Axial computed tomography images of the chest without intravenous contrast.    All CT scans at this facility use one or more dose reduction techniques, viz.:   automated exposure control; ma/kV adjustment per patient size (including   targeted exams where dose is matched to indication; i.e. head); or iterative   reconstruction technique.    Coronal reformatted images were created and reviewed.    COMPARISON:    CT CHEST WO CONTRAST 2016-09-07 11:48    FINDINGS:    LUNGS/PLEURA: Small to moderate RIGHT pleural effusion with RIGHT lung base   atelectasis/consolidation.  Peribronchial thickening predominantly in the lower   lobe/lung bases with associated narrowing of the subsegmental airways.    Calcific granuloma in the LEFT lower lobe and a thin walled small bulla. No   central obstructive endobronchial mass or abnormality. No evidence o of the f   pleural effusion or pneumothorax.       MEDIASTINUM: Mild cardiomegaly without pericardial effusion.  Severe coronary   arterial calcification/coronary stents.  Atherosclerotic disease of the aorta   without aortic aneurysm.  Mild wall thickening of the thoracic esophagus   suggestive of esophagitis/reflux.       OTHER STRUCTURES: Degenerative changes in the visualized spine.  Chronic   appearing RIGHT sided rib fractures.      Impression:         Small to moderate RIGHT pleural effusion with RIGHT lung base   atelectasis/consolidation.       Significant chronic coronary arterial calcification suggestive of CAD.      Other nonemergent findings as described.       NOTE: Suboptimal study due to extensive motion artifact.      THIS DOCUMENT HAS BEEN ELECTRONICALLY SIGNED BY ZACH OROZCO MD             I have reviewed the medications.    Assessment/Plan   Assessment / Plan     Hospital Problem List     * (Principal)Aspiration pneumonia of right  lower lobe    Parkinson disease    Dementia    Diabetes mellitus    Anxiety    GERD (gastroesophageal reflux disease)    Dysphagia, Probable             Brief Hospital Course to date:  Bernabe Irwin is a 72 y.o. male aspiration pneumonia.      Assessment & Plan:  Aspiration pneumonia with associated effusion  --abx ordered  Chronic dysphagia  --refuses feeding tube/modified diet  Parkinson's with dementia  --fairly advanced  DM  Anxiety  GERD  -  Consult palliative/hospice, and monitor on antibiotics    DVT Prophylaxis:  TULIO    CODE STATUS: Conditional Code    Disposition: I expect the patient to be discharged TBD.    Zachery Moraes MD  11/18/17  10:39 AM

## 2017-11-18 NOTE — PROGRESS NOTES
Continued Stay Note  [facility]     Patient Name: Bernabe Irwin  MRN: [id number]  Today's Date: 11/18/2017    Admit Date: 11/17/2017          Discharge Plan       11/18/17 0935    Case Management/Social Work Plan    Plan hospice note    Additional Comments Chart reviewed. Consult received.  Co-visit made with LUIS M Wade RN.  Discussed various options regarding home hospice services, primary care, palliative care, and inpatient hospice services should patient become appropriate for that service while hospitalized.  Patient's wife at bedside verbalized an understanding and denied any questions at this time but hospice and palliative to continue.              Discharge Codes     None            Justine Tan RN

## 2017-11-18 NOTE — PLAN OF CARE
Problem: Patient Care Overview (Adult)  Goal: Plan of Care Review  Outcome: Ongoing (interventions implemented as appropriate)    11/18/17 1145   Coping/Psychosocial Response Interventions   Plan Of Care Reviewed With patient   Patient Care Overview   Progress no change   Outcome Evaluation   Outcome Summary/Follow up Plan WOC nurse consulted for bilateral feet wounds/abrasions. Applied Optifoam to abrasion areas for protection against any further skin damage. Right great toe presents with scabbed area. Placed Optifoam. Keep areas covered. WOC nurse will sign off at this time. Thanks

## 2017-11-19 ENCOUNTER — APPOINTMENT (OUTPATIENT)
Dept: GENERAL RADIOLOGY | Facility: HOSPITAL | Age: 72
End: 2017-11-19

## 2017-11-19 LAB
ANION GAP SERPL CALCULATED.3IONS-SCNC: 7 MMOL/L (ref 3–11)
BASOPHILS # BLD AUTO: 0.02 10*3/MM3 (ref 0–0.2)
BASOPHILS NFR BLD AUTO: 0.5 % (ref 0–1)
BUN BLD-MCNC: 25 MG/DL (ref 9–23)
BUN/CREAT SERPL: 25 (ref 7–25)
CALCIUM SPEC-SCNC: 8.6 MG/DL (ref 8.7–10.4)
CHLORIDE SERPL-SCNC: 109 MMOL/L (ref 99–109)
CO2 SERPL-SCNC: 27 MMOL/L (ref 20–31)
CREAT BLD-MCNC: 1 MG/DL (ref 0.6–1.3)
DEPRECATED RDW RBC AUTO: 45.1 FL (ref 37–54)
EOSINOPHIL # BLD AUTO: 0.24 10*3/MM3 (ref 0–0.3)
EOSINOPHIL NFR BLD AUTO: 5.6 % (ref 0–3)
ERYTHROCYTE [DISTWIDTH] IN BLOOD BY AUTOMATED COUNT: 13.9 % (ref 11.3–14.5)
GFR SERPL CREATININE-BSD FRML MDRD: 74 ML/MIN/1.73
GLUCOSE BLD-MCNC: 94 MG/DL (ref 70–100)
HCT VFR BLD AUTO: 32.1 % (ref 38.9–50.9)
HGB BLD-MCNC: 10 G/DL (ref 13.1–17.5)
IMM GRANULOCYTES # BLD: 0.01 10*3/MM3 (ref 0–0.03)
IMM GRANULOCYTES NFR BLD: 0.2 % (ref 0–0.6)
LYMPHOCYTES # BLD AUTO: 1.26 10*3/MM3 (ref 0.6–4.8)
LYMPHOCYTES NFR BLD AUTO: 29.2 % (ref 24–44)
MCH RBC QN AUTO: 27.5 PG (ref 27–31)
MCHC RBC AUTO-ENTMCNC: 31.2 G/DL (ref 32–36)
MCV RBC AUTO: 88.2 FL (ref 80–99)
MONOCYTES # BLD AUTO: 0.51 10*3/MM3 (ref 0–1)
MONOCYTES NFR BLD AUTO: 11.8 % (ref 0–12)
NEUTROPHILS # BLD AUTO: 2.27 10*3/MM3 (ref 1.5–8.3)
NEUTROPHILS NFR BLD AUTO: 52.7 % (ref 41–71)
PLATELET # BLD AUTO: 182 10*3/MM3 (ref 150–450)
PMV BLD AUTO: 9.7 FL (ref 6–12)
POTASSIUM BLD-SCNC: 3.9 MMOL/L (ref 3.5–5.5)
RBC # BLD AUTO: 3.64 10*6/MM3 (ref 4.2–5.76)
SODIUM BLD-SCNC: 143 MMOL/L (ref 132–146)
WBC NRBC COR # BLD: 4.31 10*3/MM3 (ref 3.5–10.8)

## 2017-11-19 PROCEDURE — 25010000002 HEPARIN (PORCINE) PER 1000 UNITS: Performed by: HOSPITALIST

## 2017-11-19 PROCEDURE — 80048 BASIC METABOLIC PNL TOTAL CA: CPT | Performed by: HOSPITALIST

## 2017-11-19 PROCEDURE — 25010000002 PIPERACILLIN SOD-TAZOBACTAM PER 1 G: Performed by: FAMILY MEDICINE

## 2017-11-19 PROCEDURE — 85025 COMPLETE CBC W/AUTO DIFF WBC: CPT | Performed by: HOSPITALIST

## 2017-11-19 PROCEDURE — 99232 SBSQ HOSP IP/OBS MODERATE 35: CPT | Performed by: HOSPITALIST

## 2017-11-19 PROCEDURE — 25010000002 AZITHROMYCIN: Performed by: FAMILY MEDICINE

## 2017-11-19 PROCEDURE — 71010 HC CHEST PA OR AP: CPT

## 2017-11-19 RX ADMIN — TAZOBACTAM SODIUM AND PIPERACILLIN SODIUM 4.5 G: 500; 4 INJECTION, SOLUTION INTRAVENOUS at 20:06

## 2017-11-19 RX ADMIN — TAZOBACTAM SODIUM AND PIPERACILLIN SODIUM 4.5 G: 500; 4 INJECTION, SOLUTION INTRAVENOUS at 12:20

## 2017-11-19 RX ADMIN — ENTACAPONE 200 MG: 200 TABLET, FILM COATED ORAL at 08:30

## 2017-11-19 RX ADMIN — QUETIAPINE FUMARATE 25 MG: 25 TABLET ORAL at 20:06

## 2017-11-19 RX ADMIN — CLOPIDOGREL BISULFATE 75 MG: 75 TABLET ORAL at 08:30

## 2017-11-19 RX ADMIN — ASPIRIN 81 MG CHEWABLE TABLET 81 MG: 81 TABLET CHEWABLE at 08:29

## 2017-11-19 RX ADMIN — DONEPEZIL HYDROCHLORIDE 20 MG: 10 TABLET, FILM COATED ORAL at 20:06

## 2017-11-19 RX ADMIN — CARBIDOPA AND LEVODOPA 1 TABLET: 50; 200 TABLET, EXTENDED RELEASE ORAL at 12:20

## 2017-11-19 RX ADMIN — TAZOBACTAM SODIUM AND PIPERACILLIN SODIUM 4.5 G: 500; 4 INJECTION, SOLUTION INTRAVENOUS at 04:30

## 2017-11-19 RX ADMIN — ENTACAPONE 200 MG: 200 TABLET, FILM COATED ORAL at 12:20

## 2017-11-19 RX ADMIN — PRAMIPEXOLE DIHYDROCHLORIDE 0.75 MG: 0.25 TABLET ORAL at 17:42

## 2017-11-19 RX ADMIN — HEPARIN SODIUM 5000 UNITS: 5000 INJECTION, SOLUTION INTRAVENOUS; SUBCUTANEOUS at 21:32

## 2017-11-19 RX ADMIN — SERTRALINE HYDROCHLORIDE 50 MG: 50 TABLET ORAL at 08:29

## 2017-11-19 RX ADMIN — ENTACAPONE 200 MG: 200 TABLET, FILM COATED ORAL at 17:42

## 2017-11-19 RX ADMIN — AZITHROMYCIN 500 MG: 500 INJECTION, POWDER, LYOPHILIZED, FOR SOLUTION INTRAVENOUS at 21:32

## 2017-11-19 RX ADMIN — CARBIDOPA AND LEVODOPA 1 TABLET: 50; 200 TABLET, EXTENDED RELEASE ORAL at 17:42

## 2017-11-19 RX ADMIN — PRAMIPEXOLE DIHYDROCHLORIDE 0.75 MG: 0.25 TABLET ORAL at 08:29

## 2017-11-19 RX ADMIN — HEPARIN SODIUM 5000 UNITS: 5000 INJECTION, SOLUTION INTRAVENOUS; SUBCUTANEOUS at 14:16

## 2017-11-19 RX ADMIN — PRAMIPEXOLE DIHYDROCHLORIDE 0.75 MG: 0.25 TABLET ORAL at 12:20

## 2017-11-19 RX ADMIN — CARBIDOPA AND LEVODOPA 1 TABLET: 50; 200 TABLET, EXTENDED RELEASE ORAL at 08:30

## 2017-11-19 RX ADMIN — CYANOCOBALAMIN TAB 1000 MCG 500 MCG: 1000 TAB at 08:30

## 2017-11-19 RX ADMIN — HEPARIN SODIUM 5000 UNITS: 5000 INJECTION, SOLUTION INTRAVENOUS; SUBCUTANEOUS at 06:45

## 2017-11-19 NOTE — PLAN OF CARE
Problem: Patient Care Overview (Adult)  Goal: Plan of Care Review  Outcome: Ongoing (interventions implemented as appropriate)    11/19/17 1810   Coping/Psychosocial Response Interventions   Plan Of Care Reviewed With spouse;patient   Patient Care Overview   Progress no change   Outcome Evaluation   Outcome Summary/Follow up Plan VSS. NSR on monitor. o2 sats in 90's on room air         Problem: Pneumonia (Adult)  Goal: Signs and Symptoms of Listed Potential Problems Will be Absent or Manageable (Pneumonia)  Outcome: Ongoing (interventions implemented as appropriate)    Problem: Fall Risk (Adult)  Goal: Identify Related Risk Factors and Signs and Symptoms  Outcome: Ongoing (interventions implemented as appropriate)  Goal: Absence of Falls  Outcome: Ongoing (interventions implemented as appropriate)    Problem: Pressure Ulcer (Adult)  Goal: Signs and Symptoms of Listed Potential Problems Will be Absent or Manageable (Pressure Ulcer)  Outcome: Outcome(s) achieved Date Met:  11/19/17

## 2017-11-19 NOTE — PROGRESS NOTES
[facility] Medicine Services  PROGRESS NOTE    Patient Name: Bernabe Irwin  : 1945  MRN: [id number]    Date of Admission: 2017  Length of Stay: 1  Primary Care Physician: Warren Robles MD    Subjective   Subjective     CC:  Dyspnea    HPI:  Rested better last night. Less agitated. Answering more appropriately. No f/c. Minimal cough. Denies dyspnea. Notes some hip soreness. Ate breakfast. Wife at bedside.    Review of Systems  Negative except for above.  Otherwise ROS is negative except as mentioned in the HPI.    Objective   Objective     Vital Signs:   Temp:  [97.6 °F (36.4 °C)-97.8 °F (36.6 °C)] 97.8 °F (36.6 °C)  Heart Rate:  [52-66] 54  Resp:  [12-22] 20  BP: (101-115)/(59-65) 115/63        Physical Exam:  Sleepy but arouses, and ultimately more alert and answering more appropriately  OP clear/dry  Neck supple  RRR  Decreased R base, better air excursion throughout  +BS, ND, NT  LYONS  No rashes  Thin      Results Reviewed:  I have personally reviewed current lab, radiology, and data and agree.      Results from last 7 days  Lab Units 17  1846   WBC 10*3/mm3 4.31 5.00 7.97   HEMOGLOBIN g/dL 10.0* 10.9* 11.5*   HEMATOCRIT % 32.1* 34.4* 35.5*   PLATELETS 10*3/mm3 182 193 194       Results from last 7 days  Lab Units 17  0603 17  1846 17  1606   SODIUM mmol/L 143 139 139 140   POTASSIUM mmol/L 3.9 3.9 4.0 4.5   CHLORIDE mmol/L 109 107 103 108   CO2 mmol/L 27.0 25.0 27.0 30.0   BUN mg/dL 25* 21 27* 18   CREATININE mg/dL 1.00 0.90 1.00 0.80   GLUCOSE mg/dL 94 99 105* 136*   CALCIUM mg/dL 8.6* 9.6 9.5 9.4   ALT (SGPT) U/L  --  9 2* 5*   AST (SGOT) U/L  --  20 16 18     BNP   Date Value Ref Range Status   2017 33.0 0.0 - 100.0 pg/mL Final     No results found for: PHART    Microbiology Results Abnormal     None          Imaging Results (last 24 hours)     Procedure Component Value Units Date/Time    XR  Chest 1 View [[id number]] Updated:  11/19/17 0637             I have reviewed the medications.    Assessment/Plan   Assessment / Plan     Hospital Problem List     * (Principal)Aspiration pneumonia of right lower lobe    Parkinson disease    Dementia    Diabetes mellitus    Anxiety    GERD (gastroesophageal reflux disease)    Dysphagia, Probable             Brief Hospital Course to date:  Bernabe Irwin is a 72 y.o. male aspiration pneumonia.      Assessment & Plan:  Aspiration pneumonia with associated effusion  --abx ordered, continue/monitor  Chronic dysphagia  --refuses feeding tube/modified diet  Parkinson's with dementia  --fairly advanced per family  DM  Anxiety  GERD  -  Consulted palliative/hospice, and monitor on antibiotics    DVT Prophylaxis:  TULIO    CODE STATUS: Conditional Code    Disposition: I expect the patient to be discharged TBD.    Zachery Moraes MD  11/19/17  9:27 AM

## 2017-11-19 NOTE — PLAN OF CARE
Problem: Patient Care Overview (Adult)  Goal: Plan of Care Review  Outcome: Ongoing (interventions implemented as appropriate)    11/19/17 1010   Coping/Psychosocial Response Interventions   Plan Of Care Reviewed With spouse   Patient Care Overview   Progress no change   Outcome Evaluation   Outcome Summary/Follow up Plan Provided printed information about Palliative and Hospice home services; will obtain information about home primary care tomorrow per wife's request.

## 2017-11-19 NOTE — PLAN OF CARE
Problem: Patient Care Overview (Adult)  Goal: Plan of Care Review  Outcome: Ongoing (interventions implemented as appropriate)    11/19/17 0554   Coping/Psychosocial Response Interventions   Plan Of Care Reviewed With spouse   Patient Care Overview   Progress progress toward functional goals as expected   Outcome Evaluation   Outcome Summary/Follow up Plan Pt rested well this shift. No c/o pain. Waffle mattress and waffle boots in place. NSR. VSS.       Goal: Adult Individualization and Mutuality  Outcome: Ongoing (interventions implemented as appropriate)  Goal: Discharge Needs Assessment  Outcome: Ongoing (interventions implemented as appropriate)    Problem: Pneumonia (Adult)  Goal: Signs and Symptoms of Listed Potential Problems Will be Absent or Manageable (Pneumonia)  Outcome: Ongoing (interventions implemented as appropriate)    Problem: Fall Risk (Adult)  Goal: Identify Related Risk Factors and Signs and Symptoms  Outcome: Ongoing (interventions implemented as appropriate)  Goal: Absence of Falls  Outcome: Ongoing (interventions implemented as appropriate)

## 2017-11-20 PROCEDURE — 25010000002 HEPARIN (PORCINE) PER 1000 UNITS: Performed by: HOSPITALIST

## 2017-11-20 PROCEDURE — 25010000002 PIPERACILLIN SOD-TAZOBACTAM PER 1 G: Performed by: FAMILY MEDICINE

## 2017-11-20 PROCEDURE — 99233 SBSQ HOSP IP/OBS HIGH 50: CPT | Performed by: HOSPITALIST

## 2017-11-20 RX ORDER — AMOXICILLIN AND CLAVULANATE POTASSIUM 875; 125 MG/1; MG/1
1 TABLET, FILM COATED ORAL EVERY 12 HOURS SCHEDULED
Status: DISCONTINUED | OUTPATIENT
Start: 2017-11-20 | End: 2017-11-22 | Stop reason: HOSPADM

## 2017-11-20 RX ORDER — SACCHAROMYCES BOULARDII 250 MG
250 CAPSULE ORAL 2 TIMES DAILY
Status: DISCONTINUED | OUTPATIENT
Start: 2017-11-20 | End: 2017-11-20

## 2017-11-20 RX ORDER — DOXYCYCLINE HYCLATE 100 MG/1
100 CAPSULE ORAL EVERY 12 HOURS SCHEDULED
Status: DISCONTINUED | OUTPATIENT
Start: 2017-11-20 | End: 2017-11-22 | Stop reason: HOSPADM

## 2017-11-20 RX ORDER — SENNOSIDES 8.6 MG
2 TABLET ORAL 2 TIMES DAILY PRN
Status: DISCONTINUED | OUTPATIENT
Start: 2017-11-20 | End: 2017-11-22 | Stop reason: HOSPADM

## 2017-11-20 RX ORDER — SACCHAROMYCES BOULARDII 250 MG
250 CAPSULE ORAL 2 TIMES DAILY
Status: DISCONTINUED | OUTPATIENT
Start: 2017-11-20 | End: 2017-11-22 | Stop reason: HOSPADM

## 2017-11-20 RX ADMIN — PRAMIPEXOLE DIHYDROCHLORIDE 0.75 MG: 0.25 TABLET ORAL at 17:48

## 2017-11-20 RX ADMIN — PRAMIPEXOLE DIHYDROCHLORIDE 0.75 MG: 0.25 TABLET ORAL at 13:10

## 2017-11-20 RX ADMIN — HEPARIN SODIUM 5000 UNITS: 5000 INJECTION, SOLUTION INTRAVENOUS; SUBCUTANEOUS at 06:11

## 2017-11-20 RX ADMIN — ENTACAPONE 200 MG: 200 TABLET, FILM COATED ORAL at 13:11

## 2017-11-20 RX ADMIN — DONEPEZIL HYDROCHLORIDE 20 MG: 10 TABLET, FILM COATED ORAL at 21:26

## 2017-11-20 RX ADMIN — CARBIDOPA AND LEVODOPA 1 TABLET: 50; 200 TABLET, EXTENDED RELEASE ORAL at 13:11

## 2017-11-20 RX ADMIN — Medication 250 MG: at 09:57

## 2017-11-20 RX ADMIN — ENTACAPONE 200 MG: 200 TABLET, FILM COATED ORAL at 08:24

## 2017-11-20 RX ADMIN — CYANOCOBALAMIN TAB 1000 MCG 500 MCG: 1000 TAB at 08:24

## 2017-11-20 RX ADMIN — HEPARIN SODIUM 5000 UNITS: 5000 INJECTION, SOLUTION INTRAVENOUS; SUBCUTANEOUS at 21:27

## 2017-11-20 RX ADMIN — ASPIRIN 81 MG CHEWABLE TABLET 81 MG: 81 TABLET CHEWABLE at 08:24

## 2017-11-20 RX ADMIN — ENTACAPONE 200 MG: 200 TABLET, FILM COATED ORAL at 17:47

## 2017-11-20 RX ADMIN — DOXYCYCLINE HYCLATE 100 MG: 100 CAPSULE ORAL at 09:57

## 2017-11-20 RX ADMIN — CARBIDOPA AND LEVODOPA 1 TABLET: 50; 200 TABLET, EXTENDED RELEASE ORAL at 17:48

## 2017-11-20 RX ADMIN — CARBIDOPA AND LEVODOPA 1 TABLET: 50; 200 TABLET, EXTENDED RELEASE ORAL at 08:24

## 2017-11-20 RX ADMIN — PRAMIPEXOLE DIHYDROCHLORIDE 0.75 MG: 0.25 TABLET ORAL at 08:24

## 2017-11-20 RX ADMIN — DOXYCYCLINE HYCLATE 100 MG: 100 CAPSULE ORAL at 21:27

## 2017-11-20 RX ADMIN — AMOXICILLIN AND CLAVULANATE POTASSIUM 1 TABLET: 875; 125 TABLET, FILM COATED ORAL at 09:57

## 2017-11-20 RX ADMIN — SERTRALINE HYDROCHLORIDE 50 MG: 50 TABLET ORAL at 08:24

## 2017-11-20 RX ADMIN — QUETIAPINE FUMARATE 25 MG: 25 TABLET ORAL at 21:27

## 2017-11-20 RX ADMIN — TAZOBACTAM SODIUM AND PIPERACILLIN SODIUM 4.5 G: 500; 4 INJECTION, SOLUTION INTRAVENOUS at 03:59

## 2017-11-20 RX ADMIN — CLOPIDOGREL BISULFATE 75 MG: 75 TABLET ORAL at 08:24

## 2017-11-20 RX ADMIN — Medication 250 MG: at 17:47

## 2017-11-20 RX ADMIN — AMOXICILLIN AND CLAVULANATE POTASSIUM 1 TABLET: 875; 125 TABLET, FILM COATED ORAL at 21:26

## 2017-11-20 RX ADMIN — HEPARIN SODIUM 5000 UNITS: 5000 INJECTION, SOLUTION INTRAVENOUS; SUBCUTANEOUS at 13:11

## 2017-11-20 NOTE — PROGRESS NOTES
"Palliative Care Progress Note    Date of Admission: 11/17/2017    Code Status: DNR/DNI, conditional status  Advance Directive: living will on medical record  Surrogate decision maker: wife, Nina    Subjective: Pt awakens to answer direct questions.   Denies any pain, nausea, dyspnea, anxiety.   Wife at bedside  Reviewed current scheduled and prn medications for route, type, dose, and frequency.    Objective:  PPS 30%   /63 (BP Location: Left arm, Patient Position: Lying)  Pulse 50  Temp 97.6 °F (36.4 °C) (Oral)   Resp 16  Ht 71\" (180.3 cm)  Wt 134 lb 4.8 oz (60.9 kg)  SpO2 96%  BMI 18.73 kg/m2   Intake & Output (last day)       11/19 0701 - 11/20 0700 11/20 0701 - 11/21 0700    IV Piggyback 450     Total Intake(mL/kg) 450 (7.4)     Net +450            Unmeasured Urine Occurrence 3 x         Lab Results (last 24 hours)     ** No results found for the last 24 hours. **        Imaging Results (last 24 hours)     Procedure Component Value Units Date/Time    XR Chest 1 View [[id number]] Collected:  11/19/17 1236     Updated:  11/19/17 2150    Narrative:          EXAMINATION: XR CHEST 1 VW - 11/19/2017     INDICATION: Dyspnea on exertion.     COMPARISON: 11/14/2017.     FINDINGS: The heart and vasculature appear normal in size. The left  lung, right upper and mid lung remain clear. There is increasing hazy  interstitial opacity in the right lower lobe which may represent some  generalized lower lobe atelectasis.           Impression:       Increasing right lower lung interstitial disease whether due  to atelectasis or pneumonia.     DICTATED:     11/19/2017  EDITED:          11/19/2017        This report was finalized on 11/19/2017 9:48 PM by DR. Zachery Gastelum MD.             Physical Exam:  Gen: NAD, resting in bed  Skin: warm, dry   Eyes: ALEXYS, conjunctiva clear and moist   HEENT: oropharynx clear, moist, neck soft tissue, no lymphadenopathy   Resp/thorax: even effort, non labored, CTA   CV: RRR   ABD: soft, " bowel sounds+, nontender  Ext: no edema, no redness   Neuro: keeps eyes closed, will answer direct questions, follows commands, limited replies to questions.       Reviewed labs and diagnostic results.   Lab Results   Component Value Date    HGBA1C 5.30 07/30/2016       Results from last 7 days  Lab Units 11/19/17  0449   WBC 10*3/mm3 4.31   HEMOGLOBIN g/dL 10.0*   HEMATOCRIT % 32.1*   PLATELETS 10*3/mm3 182       Results from last 7 days  Lab Units 11/19/17  0449 11/18/17  0603   SODIUM mmol/L 143 139   POTASSIUM mmol/L 3.9 3.9   CHLORIDE mmol/L 109 107   CO2 mmol/L 27.0 25.0   BUN mg/dL 25* 21   CREATININE mg/dL 1.00 0.90   CALCIUM mg/dL 8.6* 9.6   BILIRUBIN mg/dL  --  0.6   ALK PHOS U/L  --  59   ALT (SGPT) U/L  --  9   AST (SGOT) U/L  --  20   GLUCOSE mg/dL 94 99       Impression: 72 y.o. male with Parkinson's with dementia, aspiration pneumonia RLL, DM    Plan:   Dysphagia - probable with recurrent aspiration pneumonia  Patient and wife aware patient is wanting to eat despite consequences for recurrent pneumonia.     Constipation - patient uses miralax at home for bowel regimen    Weakness - worsening Parkinson's and progressing debility    Goals of care - With decline in functional status: mobility and dysphagia primarily.   Discussed with wife that the patient may be eligible for home hospice care, especially if plan is to primarily stay at home with a primary comfort focused plan of care.   Hospice case manager to follow up tomorrow.  Time: 30 minutes   > 50% time spent in counseling and education concerning current orders for symptom management with wife    Carolyn Cervantes, APRN  [phone]  11/20/17  2:26 PM

## 2017-11-20 NOTE — PROGRESS NOTES
Discharge Planning Assessment  [facility]     Patient Name: Bernabe Irwin  MRN: [id number]  Today's Date: 11/20/2017    Admit Date: 11/17/2017          Discharge Needs Assessment       11/20/17 0922    Living Environment    Lives With spouse    Living Arrangements house    Home Accessibility no concerns    Type of Financial/Environmental Concern none    Transportation Available car;family or friend will provide    Living Environment    Provides Primary Care For no one, unable/limited ability to care for self    Quality Of Family Relationships supportive    Able to Return to Prior Living Arrangements yes    Discharge Needs Assessment    Equipment Currently Used at Home shower chair;wheelchair            Discharge Plan       11/20/17 0923    Case Management/Social Work Plan    Plan TBD    Patient/Family In Agreement With Plan yes    Additional Comments Pt lives with his wife in a house. Hospice has been consulted for possible home with hospice. SW will follow for any discharge needs.    Final Note    Final Note SW is following        Discharge Placement     No information found        Expected Discharge Date and Time     Expected Discharge Date Expected Discharge Time    Nov 22, 2017               Demographic Summary       11/20/17 0919    Referral Information    Reason For Consult discharge planning            Functional Status       11/20/17 0919    Functional Status Prior    Ambulation 1-->assistive equipment    Transferring 2-->assistive person    Toileting 2-->assistive person    Bathing 2-->assistive person    Dressing 2-->assistive person    Eating 2-->assistive person    IADL    Medications assistive person    Meal Preparation assistive person    Housekeeping assistive person    Laundry assistive person    Shopping assistive person    Oral Care independent            Psychosocial     None            Abuse/Neglect     None            Legal     None            Substance Abuse     None            Patient Forms      None          SUKHJINDER Gonzalez

## 2017-11-20 NOTE — PLAN OF CARE
Problem: Patient Care Overview (Adult)  Goal: Plan of Care Review  Outcome: Ongoing (interventions implemented as appropriate)    11/20/17 0413   Coping/Psychosocial Response Interventions   Plan Of Care Reviewed With patient;spouse   Patient Care Overview   Progress no change   Outcome Evaluation   Outcome Summary/Follow up Plan Pt. was cooperative during the evening; IV Abx. therapy continued; VSS; NSR on the monitor; Pt. turned Q2; continue to monitor.

## 2017-11-20 NOTE — PROGRESS NOTES
Continued Stay Note  [facility]     Patient Name: Bernabe Irwin  MRN: [id number]  Today's Date: 11/20/2017    Admit Date: 11/17/2017          Discharge Plan       11/20/17 1715    Case Management/Social Work Plan    Plan Undetermined    Additional Comments Hospice consult received.  Chart reviewed.  Parkinson's Disease.  Admitted with dyspnea, Rt aspiration PNA and dysphagia.  Multiple co-morbidities.  Spoke with primary RN and attempted to visit with pt.  Resting quielty.  No obvious distress.  No family at bedside.  Spoke with wife by phone.  Made appt for meeting with wife 11:30AM 11/21/17.  Will follow for hospice support and to assist/facilitate access to hospice services if elected.  If can be of further assist please contact.....ext [phone].              Discharge Codes     None        Expected Discharge Date and Time     Expected Discharge Date Expected Discharge Time    Nov 22, 2017             Nena Jean RN

## 2017-11-20 NOTE — PLAN OF CARE
Problem: Patient Care Overview (Adult)  Goal: Plan of Care Review  Outcome: Ongoing (interventions implemented as appropriate)    11/20/17 7465   Coping/Psychosocial Response Interventions   Plan Of Care Reviewed With patient;spouse   Patient Care Overview   Progress no change   Outcome Evaluation   Outcome Summary/Follow up Plan Hospice consulted for further information about available home services.

## 2017-11-20 NOTE — PLAN OF CARE
Problem: Patient Care Overview (Adult)  Goal: Plan of Care Review  Outcome: Ongoing (interventions implemented as appropriate)    11/20/17 2145   Coping/Psychosocial Response Interventions   Plan Of Care Reviewed With patient   Patient Care Overview   Progress no change   Outcome Evaluation   Outcome Summary/Follow up Plan VSS. No current complaints from pt at this time. Pt has rested confortably most of shift. Hospice has been consulted. IV came out today and MD gave order to leafve out for now. Will continue to monitor.

## 2017-11-20 NOTE — PROGRESS NOTES
"Adult Nutrition  Assessment/PES    Patient Name:  Bernabe Irwin  YOB: 1945  MRN: [id number]  Admit Date:  11/17/2017    Assessment Date:  11/20/2017          Reason for Assessment       11/20/17 0953    Reason for Assessment    Reason For Assessment/Visit follow up protocol    Time Spent (min) 20    Diagnosis Diagnosis   per notes this admission              Nutrition/Diet History       11/20/17 0953    Nutrition/Diet History    Reported/Observed By Patient;FNS    Other Patient reports good appetite. Observed patient had eaten ~75% or more of breakfast tray. Tolerating diet well.  in room at time of visit to take patient menu choices.            Anthropometrics       11/20/17 0954    Anthropometrics (Special Considerations)    Height Used for Calculations 1.803 m (5' 11\")    Weight Used for Calculations 60.9 kg (134 lb 4.2 oz)   bed scale weight per charting 11/18            Labs/Tests/Procedures/Meds       11/20/17 0955    Labs/Tests/Procedures/Meds    Labs/Tests Review Reviewed                Nutrition Prescription Ordered       11/20/17 0955    Nutrition Prescription PO    Current PO Diet Regular   comfort diet            Evaluation of Received Nutrient/Fluid Intake       11/20/17 0955    PO Evaluation    Number of Days PO Intake Evaluated --   PO intake recorded from (11/18)    Number of Meals 3    % PO Intake 58%          Problem/Interventions:        Problem 2       11/20/17 0955    Nutrition Diagnoses Problem 2    Problem 2 Inadequate Intake/Infusion    Inadequate Intake Type Oral    Etiology (related to) --   clinical condition    Signs/Symptoms (evidenced by) PO Intake    Percent (%) intake recorded 58 %    Over number of meals 3                  Intervention Goal       11/20/17 0956    Intervention Goal    General Nutrition support treatment   hospice consult pending            Nutrition Intervention       11/20/17 0956    Nutrition Intervention    RD/Tech Action Advise " alternate selection;Encourage intake;Follow Tx progress;Care plan reviewd              Education/Evaluation       11/20/17 0956    Monitor/Evaluation    Monitor Per protocol;PO intake   POC/GOC        Electronically signed by:  Gissell West  11/20/17 9:56 AM

## 2017-11-20 NOTE — SIGNIFICANT NOTE
Palliative Team Meeting Attendance  13:00  LUIS M Wade RN, CHPN  DO CLARK Gibbons, BARBW, [facility]-   VIOLETTE Abdi MDiv   ENEIDA Cervantes, APRN

## 2017-11-20 NOTE — PROGRESS NOTES
[facility] Medicine Services  PROGRESS NOTE    Patient Name: Bernabe Irwin  : 1945  MRN: [id number]    Date of Admission: 2017  Length of Stay: 2  Primary Care Physician: Warren Robles MD    Subjective   Subjective     CC:  Dyspnea    HPI:  Rested better last night. Much more alert and awake. Denies dyspnea, but has a cough. Little sputum. No f/c.     Review of Systems  Negative except for above.  Otherwise ROS is negative except as mentioned in the HPI.    Objective   Objective     Vital Signs:   Temp:  [96.7 °F (35.9 °C)-98.8 °F (37.1 °C)] 97.1 °F (36.2 °C)  Heart Rate:  [46-62] 46  Resp:  [16-20] 16  BP: (103-121)/(59-80) 116/60        Physical Exam:  Awake and alert, looks much better  OP clear, MMM  Neck supple  RRR  Decreased R base, better air excursion throughout, but getting better/improved  +BS, ND, NT  LYONS  No rashes  Thin      Results Reviewed:  I have personally reviewed current lab, radiology, and data and agree.      Results from last 7 days  Lab Units 17  0603 17  1846   WBC 10*3/mm3 4.31 5.00 7.97   HEMOGLOBIN g/dL 10.0* 10.9* 11.5*   HEMATOCRIT % 32.1* 34.4* 35.5*   PLATELETS 10*3/mm3 182 193 194       Results from last 7 days  Lab Units 17  0603 17  1846 17  1606   SODIUM mmol/L 143 139 139 140   POTASSIUM mmol/L 3.9 3.9 4.0 4.5   CHLORIDE mmol/L 109 107 103 108   CO2 mmol/L 27.0 25.0 27.0 30.0   BUN mg/dL 25* 21 27* 18   CREATININE mg/dL 1.00 0.90 1.00 0.80   GLUCOSE mg/dL 94 99 105* 136*   CALCIUM mg/dL 8.6* 9.6 9.5 9.4   ALT (SGPT) U/L  --  9 2* 5*   AST (SGOT) U/L  --  20 16 18     BNP   Date Value Ref Range Status   2017 33.0 0.0 - 100.0 pg/mL Final     No results found for: PHART    Microbiology Results Abnormal     None          Imaging Results (last 24 hours)     Procedure Component Value Units Date/Time    XR Chest 1 View [[id number]] Collected:  17 1236     Updated:   11/19/17 2150    Narrative:          EXAMINATION: XR CHEST 1 VW - 11/19/2017     INDICATION: Dyspnea on exertion.     COMPARISON: 11/14/2017.     FINDINGS: The heart and vasculature appear normal in size. The left  lung, right upper and mid lung remain clear. There is increasing hazy  interstitial opacity in the right lower lobe which may represent some  generalized lower lobe atelectasis.           Impression:       Increasing right lower lung interstitial disease whether due  to atelectasis or pneumonia.     DICTATED:     11/19/2017  EDITED:          11/19/2017        This report was finalized on 11/19/2017 9:48 PM by DR. Zachery Gastelum MD.                I have reviewed the medications.    Assessment/Plan   Assessment / Plan     Hospital Problem List     * (Principal)Aspiration pneumonia of right lower lobe    Parkinson disease    Dementia    Diabetes mellitus    Anxiety    GERD (gastroesophageal reflux disease)    Dysphagia, Probable             Brief Hospital Course to date:  Bernabe Irwin is a 72 y.o. male aspiration pneumonia.      Assessment & Plan:  Aspiration pneumonia with associated effusion  --change to PO abx, and monitor, exam improved  Chronic dysphagia  --refuses feeding tube/modified diet  Parkinson's with dementia  --fairly advanced per family, better MS today  DM  Anxiety  GERD  -  Consulted palliative/hospice, and monitor on antibiotics    DVT Prophylaxis:  TULIO    CODE STATUS: Conditional Code    Disposition: I expect the patient to be discharged TBD.    Zachery Moraes MD  11/20/17  9:26 AM

## 2017-11-21 ENCOUNTER — APPOINTMENT (OUTPATIENT)
Dept: GENERAL RADIOLOGY | Facility: HOSPITAL | Age: 72
End: 2017-11-21

## 2017-11-21 LAB
ANION GAP SERPL CALCULATED.3IONS-SCNC: 7 MMOL/L (ref 3–11)
BASOPHILS # BLD AUTO: 0.02 10*3/MM3 (ref 0–0.2)
BASOPHILS NFR BLD AUTO: 0.4 % (ref 0–1)
BUN BLD-MCNC: 22 MG/DL (ref 9–23)
BUN/CREAT SERPL: 27.5 (ref 7–25)
CALCIUM SPEC-SCNC: 8.9 MG/DL (ref 8.7–10.4)
CHLORIDE SERPL-SCNC: 103 MMOL/L (ref 99–109)
CO2 SERPL-SCNC: 24 MMOL/L (ref 20–31)
CREAT BLD-MCNC: 0.8 MG/DL (ref 0.6–1.3)
DEPRECATED RDW RBC AUTO: 43.7 FL (ref 37–54)
EOSINOPHIL # BLD AUTO: 0.25 10*3/MM3 (ref 0–0.3)
EOSINOPHIL NFR BLD AUTO: 5.2 % (ref 0–3)
ERYTHROCYTE [DISTWIDTH] IN BLOOD BY AUTOMATED COUNT: 13.9 % (ref 11.3–14.5)
GFR SERPL CREATININE-BSD FRML MDRD: 96 ML/MIN/1.73
GLUCOSE BLD-MCNC: 83 MG/DL (ref 70–100)
HCT VFR BLD AUTO: 33.9 % (ref 38.9–50.9)
HGB BLD-MCNC: 11 G/DL (ref 13.1–17.5)
IMM GRANULOCYTES # BLD: 0.02 10*3/MM3 (ref 0–0.03)
IMM GRANULOCYTES NFR BLD: 0.4 % (ref 0–0.6)
LYMPHOCYTES # BLD AUTO: 1.19 10*3/MM3 (ref 0.6–4.8)
LYMPHOCYTES NFR BLD AUTO: 24.6 % (ref 24–44)
MCH RBC QN AUTO: 28.1 PG (ref 27–31)
MCHC RBC AUTO-ENTMCNC: 32.4 G/DL (ref 32–36)
MCV RBC AUTO: 86.7 FL (ref 80–99)
MONOCYTES # BLD AUTO: 0.45 10*3/MM3 (ref 0–1)
MONOCYTES NFR BLD AUTO: 9.3 % (ref 0–12)
NEUTROPHILS # BLD AUTO: 2.91 10*3/MM3 (ref 1.5–8.3)
NEUTROPHILS NFR BLD AUTO: 60.1 % (ref 41–71)
PLAT MORPH BLD: NORMAL
PLATELET # BLD AUTO: 169 10*3/MM3 (ref 150–450)
PMV BLD AUTO: 9.8 FL (ref 6–12)
POTASSIUM BLD-SCNC: 4.1 MMOL/L (ref 3.5–5.5)
RBC # BLD AUTO: 3.91 10*6/MM3 (ref 4.2–5.76)
RBC MORPH BLD: NORMAL
SODIUM BLD-SCNC: 134 MMOL/L (ref 132–146)
TROPONIN I SERPL-MCNC: <0.006 NG/ML
WBC MORPH BLD: NORMAL
WBC NRBC COR # BLD: 4.84 10*3/MM3 (ref 3.5–10.8)

## 2017-11-21 PROCEDURE — 85025 COMPLETE CBC W/AUTO DIFF WBC: CPT | Performed by: HOSPITALIST

## 2017-11-21 PROCEDURE — 71010 HC CHEST PA OR AP: CPT

## 2017-11-21 PROCEDURE — 93010 ELECTROCARDIOGRAM REPORT: CPT | Performed by: INTERNAL MEDICINE

## 2017-11-21 PROCEDURE — 93005 ELECTROCARDIOGRAM TRACING: CPT | Performed by: NURSE PRACTITIONER

## 2017-11-21 PROCEDURE — 99232 SBSQ HOSP IP/OBS MODERATE 35: CPT | Performed by: NURSE PRACTITIONER

## 2017-11-21 PROCEDURE — 25010000002 HEPARIN (PORCINE) PER 1000 UNITS: Performed by: HOSPITALIST

## 2017-11-21 PROCEDURE — 80048 BASIC METABOLIC PNL TOTAL CA: CPT | Performed by: HOSPITALIST

## 2017-11-21 PROCEDURE — 84484 ASSAY OF TROPONIN QUANT: CPT | Performed by: NURSE PRACTITIONER

## 2017-11-21 PROCEDURE — 85007 BL SMEAR W/DIFF WBC COUNT: CPT | Performed by: HOSPITALIST

## 2017-11-21 RX ORDER — FAMOTIDINE 10 MG/ML
20 INJECTION, SOLUTION INTRAVENOUS EVERY 12 HOURS SCHEDULED
Status: DISCONTINUED | OUTPATIENT
Start: 2017-11-21 | End: 2017-11-22 | Stop reason: HOSPADM

## 2017-11-21 RX ADMIN — HEPARIN SODIUM 5000 UNITS: 5000 INJECTION, SOLUTION INTRAVENOUS; SUBCUTANEOUS at 05:37

## 2017-11-21 RX ADMIN — DONEPEZIL HYDROCHLORIDE 20 MG: 10 TABLET, FILM COATED ORAL at 22:41

## 2017-11-21 RX ADMIN — SERTRALINE HYDROCHLORIDE 50 MG: 50 TABLET ORAL at 08:28

## 2017-11-21 RX ADMIN — CYANOCOBALAMIN TAB 1000 MCG 500 MCG: 1000 TAB at 08:29

## 2017-11-21 RX ADMIN — CLOPIDOGREL BISULFATE 75 MG: 75 TABLET ORAL at 08:28

## 2017-11-21 RX ADMIN — AMOXICILLIN AND CLAVULANATE POTASSIUM 1 TABLET: 875; 125 TABLET, FILM COATED ORAL at 22:41

## 2017-11-21 RX ADMIN — DOXYCYCLINE HYCLATE 100 MG: 100 CAPSULE ORAL at 22:41

## 2017-11-21 RX ADMIN — CARBIDOPA AND LEVODOPA 1 TABLET: 50; 200 TABLET, EXTENDED RELEASE ORAL at 17:41

## 2017-11-21 RX ADMIN — DOXYCYCLINE HYCLATE 100 MG: 100 CAPSULE ORAL at 08:28

## 2017-11-21 RX ADMIN — ENTACAPONE 200 MG: 200 TABLET, FILM COATED ORAL at 08:28

## 2017-11-21 RX ADMIN — PRAMIPEXOLE DIHYDROCHLORIDE 0.75 MG: 0.25 TABLET ORAL at 08:28

## 2017-11-21 RX ADMIN — POLYETHYLENE GLYCOL 3350 17 G: 17 POWDER, FOR SOLUTION ORAL at 08:29

## 2017-11-21 RX ADMIN — LIDOCAINE HYDROCHLORIDE: 20 SOLUTION ORAL; TOPICAL at 13:50

## 2017-11-21 RX ADMIN — ASPIRIN 81 MG CHEWABLE TABLET 81 MG: 81 TABLET CHEWABLE at 08:28

## 2017-11-21 RX ADMIN — CARBIDOPA AND LEVODOPA 1 TABLET: 50; 200 TABLET, EXTENDED RELEASE ORAL at 08:28

## 2017-11-21 RX ADMIN — AMOXICILLIN AND CLAVULANATE POTASSIUM 1 TABLET: 875; 125 TABLET, FILM COATED ORAL at 08:28

## 2017-11-21 RX ADMIN — PRAMIPEXOLE DIHYDROCHLORIDE 0.75 MG: 0.25 TABLET ORAL at 17:41

## 2017-11-21 RX ADMIN — QUETIAPINE FUMARATE 25 MG: 25 TABLET ORAL at 22:41

## 2017-11-21 RX ADMIN — Medication 250 MG: at 17:41

## 2017-11-21 RX ADMIN — ENTACAPONE 200 MG: 200 TABLET, FILM COATED ORAL at 17:41

## 2017-11-21 RX ADMIN — Medication 250 MG: at 08:28

## 2017-11-21 RX ADMIN — HEPARIN SODIUM 5000 UNITS: 5000 INJECTION, SOLUTION INTRAVENOUS; SUBCUTANEOUS at 22:42

## 2017-11-21 NOTE — PLAN OF CARE
Problem: Fall Risk (Adult)  Intervention: Reduce Risk/Promote Restraint Free Environment    11/21/17 0451   Restraint Interventions   Safety Promotion/Fall Prevention fall prevention program maintained   Safety Interventions   Safety/Security Measures bed alarm set         Goal: Absence of Falls  Outcome: Ongoing (interventions implemented as appropriate)

## 2017-11-21 NOTE — PLAN OF CARE
Problem: Patient Care Overview (Adult)  Goal: Plan of Care Review  Outcome: Ongoing (interventions implemented as appropriate)    11/21/17 0454   Coping/Psychosocial Response Interventions   Plan Of Care Reviewed With patient   Patient Care Overview   Progress progress toward functional goals as expected   Outcome Evaluation   Outcome Summary/Follow up Plan VSS remains on room air sinus maria antonia tolerating po meds awaiting hospice consult with wife

## 2017-11-21 NOTE — SIGNIFICANT NOTE
Palliative Team Meeting Attendance  13:00  LUIS M Wade RN, CHPN  [facility] CLARK Gibbons[facility]   ENEIDA Cervantes, ANGEL Jurado, RN, CHPN

## 2017-11-21 NOTE — PLAN OF CARE
Problem: Patient Care Overview (Adult)  Goal: Plan of Care Review  Outcome: Ongoing (interventions implemented as appropriate)    11/21/17 1610   Coping/Psychosocial Response Interventions   Plan Of Care Reviewed With patient   Patient Care Overview   Progress no change   Outcome Evaluation   Outcome Summary/Follow up Plan VSS. Pt complained of CP when being examined by APRN and a STAT EKG, troponin, and GI Cocktail was ordered per APRN. GI cocktail improved pain. Pt is now resting comfortably with no current comaplints at this time. Will continue to monitor.

## 2017-11-21 NOTE — PROGRESS NOTES
Continued Stay Note  [facility]     Patient Name: Bernabe Irwin  MRN: [id number]  Today's Date: 11/21/2017    Admit Date: 11/17/2017          Discharge Plan       11/21/17 1521    Case Management/Social Work Plan    Plan [facility] home services    Patient/Family In Agreement With Plan yes    Additional Comments Chart reviewed.  Awake.  Alert.  Conversant at times.  Wife/dtr/son-in-law at bedside. Hospice Nurse Liaison has met with this pt/family on previous admissions and discussed hospice services.  Reviewed diagnosis, prognosis, current status and goals of care. Reviewed hospice home care services.  Given written info re: hospice, [facility] and contact numbers for the Hospice Nurse Liaison.  All agreeable for  hospice services.  Medical records faxed and update to [facility].  DME ordered to be delivered to the home in the AM.  Plan is transport home per private car 11/22/17 if medically ready for d/c.  Wife requesting list of private duty services  - she works full-time and would like to hire someone to be at the home at lunch time.  Update to Hospitalist APRN.  Will follow for hospice support and to assist/faciliatate access to hospice services.  If can be of further assist please contact....ext [phone].              Discharge Codes     None        Expected Discharge Date and Time     Expected Discharge Date Expected Discharge Time    Nov 22, 2017             Nena Jean RN

## 2017-11-21 NOTE — PROGRESS NOTES
[facility]  PROGRESS NOTE    Patient Name: Bernabe Irwin  : 1945  MRN: [id number]    Date of Admission: 2017  Length of Stay: 3  Primary Care Physician: Warren Robles MD    Subjective   Subjective     CC:  Dyspnea    HPI:  Wife at bedside, he has some chest pain radiated across his chest, she reports cough seems worse today     Review of Systems  Gen- No fevers, chills  GI- No N/V/D, abd pain  .  Otherwise ROS is negative except as mentioned in the HPI.    Objective   Objective     Vital Signs:   Temp:  [97.4 °F (36.3 °C)-97.9 °F (36.6 °C)] 97.4 °F (36.3 °C)  Heart Rate:  [48-64] 51  Resp:  [16-20] 16  BP: (100-130)/(55-67) 130/67        Physical Exam:  Gen-no acute distress, sitting in bed, wife helping him eat lunch  CV-RRR, S1 S2 normal, no m/r/g  Resp-CTAB, no wheezes  Abd-soft, NT, ND, +BS  Ext-no edema  Neuro-A&Ox3, no focal deficits  Psych-appropriate mood      Results Reviewed:  I have personally reviewed current lab, radiology, and data and agree.      Results from last 7 days  Lab Units 17  0654 17  0449 17  0603   WBC 10*3/mm3 4.84 4.31 5.00   HEMOGLOBIN g/dL 11.0* 10.0* 10.9*   HEMATOCRIT % 33.9* 32.1* 34.4*   PLATELETS 10*3/mm3 169 182 193       Results from last 7 days  Lab Units 17  1349 17  0654 17  0449 17  0603 17  1846 17  1606   SODIUM mmol/L  --  134 143 139 139 140   POTASSIUM mmol/L  --  4.1 3.9 3.9 4.0 4.5   CHLORIDE mmol/L  --  103 109 107 103 108   CO2 mmol/L  --  24.0 27.0 25.0 27.0 30.0   BUN mg/dL  --  22 25* 21 27* 18   CREATININE mg/dL  --  0.80 1.00 0.90 1.00 0.80   GLUCOSE mg/dL  --  83 94 99 105* 136*   CALCIUM mg/dL  --  8.9 8.6* 9.6 9.5 9.4   ALT (SGPT) U/L  --   --   --  9 2* 5*   AST (SGOT) U/L  --   --   --  20 16 18   TROPONIN I ng/mL <0.006  --   --   --   --   --      No results found for: BNP  No results found for: PHART    Microbiology Results Abnormal      None          Imaging Results (last 24 hours)     Procedure Component Value Units Date/Time    XR Chest 1 View [[id number]] Collected:  11/21/17 1106     Updated:  11/21/17 1106    Narrative:       EXAMINATION: XR CHEST 1 VW-11/21/2017:      INDICATION: DYSPNEA ON EXERTION.      COMPARISON: 11/19/2017.     FINDINGS: Patchy disease of the right lung base appears improved. The  lungs elsewhere remain clear. Heart and vasculature appear normal in  size.           Impression:       Improving right basilar atelectasis compared to 11/19/2017  study.     D:  11/21/2017  E:  11/21/2017                      I have reviewed the medications.    Assessment/Plan   Assessment / Plan     Hospital Problem List     * (Principal)Aspiration pneumonia of right lower lobe    Parkinson disease    Dementia    Diabetes mellitus    Anxiety    GERD (gastroesophageal reflux disease)    Dysphagia, Probable             Brief Hospital Course to date:  Bernabe Irwin is a 72 y.o. male aspiration pneumonia.      Assessment & Plan:  Aspiration pneumonia with associated effusion  --continue oral anbx, treat for 7 days total  --discussed at length with wife he is at continued risk for pneumonia and his cough may not get better given he is actively aspirating   Chronic dysphagia  --refuses feeding tube/modified diet  Parkinson's with dementia  --fairly advanced per family, better MS today  DM  Anxiety  GERD  Chest Pain  -troponin negative, EKG normal, resolved with GI cocktail, will start Pepci      DVT Prophylaxis:  TULIO    CODE STATUS: Conditional Code    Disposition: I expect the patient to be discharged home tomorrow with hospice    ANGEL Burton  11/21/17  3:40 PM

## 2017-11-21 NOTE — PLAN OF CARE
Problem: Patient Care Overview (Adult)  Goal: Plan of Care Review  Outcome: Ongoing (interventions implemented as appropriate)    11/21/17 0923   Coping/Psychosocial Response Interventions   Plan Of Care Reviewed With (pt sleeping, no family present)   Patient Care Overview   Progress no change   Outcome Evaluation   Outcome Summary/Follow up Plan Providing information about home services including primary care, Palliative, Hospice. Hospice Liaison meeting with wife today.

## 2017-11-22 VITALS
OXYGEN SATURATION: 93 % | BODY MASS INDEX: 18.8 KG/M2 | SYSTOLIC BLOOD PRESSURE: 134 MMHG | HEART RATE: 68 BPM | TEMPERATURE: 98 F | DIASTOLIC BLOOD PRESSURE: 80 MMHG | RESPIRATION RATE: 16 BRPM | WEIGHT: 134.3 LBS | HEIGHT: 71 IN

## 2017-11-22 PROCEDURE — 25010000002 HEPARIN (PORCINE) PER 1000 UNITS: Performed by: HOSPITALIST

## 2017-11-22 PROCEDURE — 99239 HOSP IP/OBS DSCHRG MGMT >30: CPT | Performed by: NURSE PRACTITIONER

## 2017-11-22 RX ORDER — SACCHAROMYCES BOULARDII 250 MG
250 CAPSULE ORAL 2 TIMES DAILY
Qty: 10 CAPSULE | Refills: 0 | Status: SHIPPED | OUTPATIENT
Start: 2017-11-22

## 2017-11-22 RX ORDER — FAMOTIDINE 20 MG/1
20 TABLET, FILM COATED ORAL 2 TIMES DAILY
Qty: 60 TABLET | Refills: 0 | Status: SHIPPED | OUTPATIENT
Start: 2017-11-22

## 2017-11-22 RX ORDER — DOXYCYCLINE HYCLATE 100 MG/1
100 CAPSULE ORAL EVERY 12 HOURS SCHEDULED
Qty: 6 CAPSULE | Refills: 0 | Status: SHIPPED | OUTPATIENT
Start: 2017-11-22 | End: 2017-11-25

## 2017-11-22 RX ORDER — AMOXICILLIN AND CLAVULANATE POTASSIUM 875; 125 MG/1; MG/1
1 TABLET, FILM COATED ORAL EVERY 12 HOURS SCHEDULED
Qty: 6 TABLET | Refills: 0 | Status: SHIPPED | OUTPATIENT
Start: 2017-11-22 | End: 2017-11-25

## 2017-11-22 RX ORDER — PRAMIPEXOLE DIHYDROCHLORIDE 0.75 MG/1
0.75 TABLET ORAL 3 TIMES DAILY
Qty: 30 TABLET | Refills: 0 | Status: SHIPPED | OUTPATIENT
Start: 2017-11-22

## 2017-11-22 RX ADMIN — DOXYCYCLINE HYCLATE 100 MG: 100 CAPSULE ORAL at 09:03

## 2017-11-22 RX ADMIN — CARBIDOPA AND LEVODOPA 1 TABLET: 50; 200 TABLET, EXTENDED RELEASE ORAL at 09:03

## 2017-11-22 RX ADMIN — PRAMIPEXOLE DIHYDROCHLORIDE 0.75 MG: 0.25 TABLET ORAL at 09:02

## 2017-11-22 RX ADMIN — ASPIRIN 81 MG CHEWABLE TABLET 81 MG: 81 TABLET CHEWABLE at 09:03

## 2017-11-22 RX ADMIN — ENTACAPONE 200 MG: 200 TABLET, FILM COATED ORAL at 09:03

## 2017-11-22 RX ADMIN — Medication 250 MG: at 09:03

## 2017-11-22 RX ADMIN — SERTRALINE HYDROCHLORIDE 50 MG: 50 TABLET ORAL at 09:03

## 2017-11-22 RX ADMIN — POLYETHYLENE GLYCOL 3350 17 G: 17 POWDER, FOR SOLUTION ORAL at 09:02

## 2017-11-22 RX ADMIN — PRAMIPEXOLE DIHYDROCHLORIDE 0.75 MG: 0.25 TABLET ORAL at 14:43

## 2017-11-22 RX ADMIN — HEPARIN SODIUM 5000 UNITS: 5000 INJECTION, SOLUTION INTRAVENOUS; SUBCUTANEOUS at 05:31

## 2017-11-22 RX ADMIN — ENTACAPONE 200 MG: 200 TABLET, FILM COATED ORAL at 14:44

## 2017-11-22 RX ADMIN — CLOPIDOGREL BISULFATE 75 MG: 75 TABLET ORAL at 09:03

## 2017-11-22 RX ADMIN — CARBIDOPA AND LEVODOPA 1 TABLET: 50; 200 TABLET, EXTENDED RELEASE ORAL at 14:44

## 2017-11-22 RX ADMIN — HEPARIN SODIUM 5000 UNITS: 5000 INJECTION, SOLUTION INTRAVENOUS; SUBCUTANEOUS at 14:44

## 2017-11-22 RX ADMIN — AMOXICILLIN AND CLAVULANATE POTASSIUM 1 TABLET: 875; 125 TABLET, FILM COATED ORAL at 09:03

## 2017-11-22 RX ADMIN — CYANOCOBALAMIN TAB 1000 MCG 500 MCG: 1000 TAB at 09:03

## 2017-11-22 NOTE — PLAN OF CARE
Problem: Patient Care Overview (Adult)  Goal: Plan of Care Review    11/22/17 0958   Coping/Psychosocial Response Interventions   Plan Of Care Reviewed With patient   Patient Care Overview   Progress no change

## 2017-11-22 NOTE — PLAN OF CARE
Problem: Fall Risk (Adult)  Intervention: Reduce Risk/Promote Restraint Free Environment    11/22/17 0507   Restraint Interventions   Safety Promotion/Fall Prevention safety round/check completed   Safety Interventions   Safety/Security Measures bed alarm set         Goal: Absence of Falls  Outcome: Ongoing (interventions implemented as appropriate)

## 2017-11-22 NOTE — PLAN OF CARE
Problem: Patient Care Overview (Adult)  Goal: Plan of Care Review  Outcome: Ongoing (interventions implemented as appropriate)    11/22/17 9237   Coping/Psychosocial Response Interventions   Plan Of Care Reviewed With patient   Patient Care Overview   Progress progress toward functional goals as expected   Outcome Evaluation   Outcome Summary/Follow up Plan no c/o pain tonight a little more restless remains room air sinus/sinus maria antonia Hopefully home with hospice

## 2017-11-22 NOTE — PROGRESS NOTES
Adult Nutrition  Assessment/PES    Patient Name:  Bernabe Irwin  YOB: 1945  MRN: [id number]  Admit Date:  11/17/2017    Assessment Date:  11/22/2017    Comments:            Reason for Assessment       11/22/17 1444    Reason for Assessment    Reason For Assessment/Visit follow up protocol    Time Spent (min) 5                              Problem/Interventions:          Problem 2       11/22/17 1444    Nutrition Diagnoses Problem 2    Problem 2 Inadequate Intake/Infusion    Inadequate Intake Type Oral    Etiology (related to) --   clinical condition    Signs/Symptoms (evidenced by) PO Intake    Percent (%) intake recorded 58 %    Over number of meals 3    Resolved? Yes            Problem 3       11/22/17 1444    Nutrition Diagnoses Problem 3    Problem 3 Nutrition Appropriate for Condition at this Time    Signs/Symptoms (evidenced by) PO Intake                      Education/Evaluation       11/22/17 1444    Monitor/Evaluation    Monitor Per protocol        Electronically signed by:  Bridgett Bueno RD  11/22/17 2:45 PM

## 2017-11-22 NOTE — SIGNIFICANT NOTE
Palliative Team Meeting Attendance  13:00  LUIS M Wade RN, CHDO CLARK Ramon, [facility], [facility]-   ENEIDA Cervantes, APRN  SHERI Jurado, RN, JONAS Mcwilliams MDiv, [facility]

## 2017-11-22 NOTE — PROGRESS NOTES
Continued Stay Note  [facility]     Patient Name: Bernabe Irwin  MRN: [id number]  Today's Date: 11/22/2017    Admit Date: 11/17/2017          Discharge Plan       11/22/17 1332    Case Management/Social Work Plan    Plan [facility] home services    Patient/Family In Agreement With Plan yes    Final Note    Final Note D/C planned per private car to home with [facility] home services today.  DME - hospital bed and bedside table - delivered to home today.  Wife at bedside.  Given contact number for hospice on arrival home.  Update to [facility] , Shania Mcnair, and Gissell Carter LPN, [facility]  Admission Guardian.                Discharge Codes     None        Expected Discharge Date and Time     Expected Discharge Date Expected Discharge Time    Nov 22, 2017             Nena Jean RN

## 2017-11-22 NOTE — DISCHARGE SUMMARY
[facility] Medicine Services  DISCHARGE SUMMARY    Patient Name: Bernabe Irwin  : 1945  MRN: [id number]    Date of Admission: 2017  Date of Discharge:    Length of Stay: 4  Primary Care Physician: Warren Robles MD    Consults     Date and Time Order Name Status Description    2017 0239 Inpatient Consult to Palliative Care MD Completed         Hospital Course     Presenting Problem:   Aspiration pneumonia of right lower lobe, unspecified aspiration pneumonia type [J69.0]    Active Hospital Problems (** Indicates Principal Problem)    Diagnosis Date Noted   • **Aspiration pneumonia of right lower lobe [J69.0] 2017   • Dysphagia, Probable [R13.10] 2016   • Anxiety [F41.9]    • Diabetes mellitus [E11.9]    • GERD (gastroesophageal reflux disease) [K21.9]    • Dementia [F03.90] 2016   • Parkinson disease [G20] 2016      Resolved Hospital Problems    Diagnosis Date Noted Date Resolved   No resolved problems to display.          Hospital Course:  Bernabe Irwin is a 72 y.o. male with PMH of Parkinson's dementia, dysphasia, CAD, GERD, HLD presented to [facility] ED with complaints of progressive lethargy and coughing with liquids.  Recent history of food lodging into his esophagus within the week.  Admitted hospital medicine for treatment of aspiration pneumonia with associated effusion.  Started on IV antibiotics.  Patient with known chronic dysphasia and refuses feeding tube or a modified diet.  Palliative care was consulted to discuss goals of care.  Patient and wife indicating that they're aware patient has had functional decline and at high risk for recurrent aspiration pneumonia due to dysphagia.  Comfort was made goal of care.  Did experience some atypical chest pain while hospitalized with negative troponins, no ischemic changes on EKG, and pain resolved with GI cocktail.  Oral Pepcid was started.  Has been decided to have [facility] care  follow him after discharge from hospital.  Patient is now medically ready for discharge and will be discharged home with wife in private vehicle today.  Will continue oral antibiotics for a total of 7 days treatment.  Needs to follow-up with PCP in 1-2 weeks.  Family has been given contact information for [facility] hospice care at time of discharge.  DME including hospital bed was arranged for delivery on 11/22/17 in preparation for patients discharge.           Day of Discharge     HPI:   Sitting up in bed quietly.  Appears comfortable.  NAD.  Nonproductive coarse cough.  States breathing is okay this morning.  No changes overnight. Denies f/c, n/v/d, soa or cp     Review of Systems  Otherwise ROS is negative except as mentioned in the HPI.    Vital Signs:   Temp:  [98.2 °F (36.8 °C)-98.5 °F (36.9 °C)] 98.2 °F (36.8 °C)  Heart Rate:  [57-68] 57  Resp:  [16-20] 16  BP: (116-122)/(56-65) 116/61     Physical Exam:  Constitutional: No acute distress, awake  Eyes: PERRLA, sclerae anicteric, no conjunctival injection  HENT: NCAT, mucous membranes moist  Neck: Supple, no thyromegaly, no lymphadenopathy, trachea midline  Respiratory: Rhonchi bilateral lower lobes, nonlabored respirations   Cardiovascular: RRR, no murmurs, rubs, or gallops, palpable pedal pulses bilaterally  Gastrointestinal: Positive bowel sounds, soft, nontender, nondistended  Musculoskeletal: No bilateral ankle edema  Psychiatric: Appropriate affect, cooperative  Neurologic: Oriented x 3, strength symmetric in all extremities, Cranial Nerves grossly intact to confrontation, speech clear  Skin: No rashes     Pertinent  and/or Most Recent Results         Results from last 7 days  Lab Units 11/21/17  0654 11/19/17  0449 11/18/17  0603 11/17/17  1846   WBC 10*3/mm3 4.84 4.31 5.00 7.97   HEMOGLOBIN g/dL 11.0* 10.0* 10.9* 11.5*   HEMATOCRIT % 33.9* 32.1* 34.4* 35.5*   PLATELETS 10*3/mm3 169 182 193 194   SODIUM mmol/L 134 143 139 139   POTASSIUM mmol/L 4.1 3.9  3.9 4.0   CHLORIDE mmol/L 103 109 107 103   CO2 mmol/L 24.0 27.0 25.0 27.0   BUN mg/dL 22 25* 21 27*   CREATININE mg/dL 0.80 1.00 0.90 1.00   GLUCOSE mg/dL 83 94 99 105*   CALCIUM mg/dL 8.9 8.6* 9.6 9.5       Results from last 7 days  Lab Units 11/18/17  0603 11/17/17  1846   BILIRUBIN mg/dL 0.6 0.4   ALK PHOS U/L 59 60   ALT (SGPT) U/L 9 2*   AST (SGOT) U/L 20 16           Invalid input(s): TG, LDLREALC    Results from last 7 days  Lab Units 11/21/17  1349 11/17/17  1846   BNP pg/mL  --  33.0   TROPONIN I ng/mL <0.006  --      Brief Urine Lab Results  (Last result in the past 365 days)      Color   Clarity   Blood   Leuk Est   Nitrite   Protein   CREAT   Urine HCG        05/07/17 2342 Orange(A) Cloudy(A) Negative Trace(A) Negative Trace(A)               Microbiology Results Abnormal     None          Imaging Results (all)     Procedure Component Value Units Date/Time    CT Chest Without Contrast [[id number]] Collected:  11/17/17 1835     Updated:  11/17/17 1944    Narrative:       EXAM:    CT Chest Without Intravenous  Contrast    CLINICAL HISTORY:    72 years old, male; Signs and symptoms; Other: Choking; Additional info:   Choking, possible aspiration on apple    TECHNIQUE:    Axial computed tomography images of the chest without intravenous contrast.    All CT scans at this facility use one or more dose reduction techniques, viz.:   automated exposure control; ma/kV adjustment per patient size (including   targeted exams where dose is matched to indication; i.e. head); or iterative   reconstruction technique.    Coronal reformatted images were created and reviewed.    COMPARISON:    CT CHEST WO CONTRAST 2016-09-07 11:48    FINDINGS:    LUNGS/PLEURA: Small to moderate RIGHT pleural effusion with RIGHT lung base   atelectasis/consolidation.  Peribronchial thickening predominantly in the lower   lobe/lung bases with associated narrowing of the subsegmental airways.    Calcific granuloma in the LEFT lower lobe and a  thin walled small bulla. No   central obstructive endobronchial mass or abnormality. No evidence o of the f   pleural effusion or pneumothorax.       MEDIASTINUM: Mild cardiomegaly without pericardial effusion.  Severe coronary   arterial calcification/coronary stents.  Atherosclerotic disease of the aorta   without aortic aneurysm.  Mild wall thickening of the thoracic esophagus   suggestive of esophagitis/reflux.       OTHER STRUCTURES: Degenerative changes in the visualized spine.  Chronic   appearing RIGHT sided rib fractures.      Impression:         Small to moderate RIGHT pleural effusion with RIGHT lung base   atelectasis/consolidation.       Significant chronic coronary arterial calcification suggestive of CAD.      Other nonemergent findings as described.       NOTE: Suboptimal study due to extensive motion artifact.      THIS DOCUMENT HAS BEEN ELECTRONICALLY SIGNED BY ZACH OROZCO MD    XR Chest 1 View [[id number]] Collected:  11/19/17 1236     Updated:  11/19/17 2150    Narrative:          EXAMINATION: XR CHEST 1 VW - 11/19/2017     INDICATION: Dyspnea on exertion.     COMPARISON: 11/14/2017.     FINDINGS: The heart and vasculature appear normal in size. The left  lung, right upper and mid lung remain clear. There is increasing hazy  interstitial opacity in the right lower lobe which may represent some  generalized lower lobe atelectasis.           Impression:       Increasing right lower lung interstitial disease whether due  to atelectasis or pneumonia.     DICTATED:     11/19/2017  EDITED:          11/19/2017        This report was finalized on 11/19/2017 9:48 PM by DR. Zachery Gastelum MD.       XR Chest 1 View [[id number]] Collected:  11/21/17 1106     Updated:  11/21/17 2141    Narrative:       EXAMINATION: XR CHEST 1 VW-11/21/2017:      INDICATION: DYSPNEA ON EXERTION.      COMPARISON: 11/19/2017.     FINDINGS: Patchy disease of the right lung base appears improved. The  lungs elsewhere remain clear.  Heart and vasculature appear normal in  size.           Impression:       Improving right basilar atelectasis compared to 11/19/2017  study.     D:  11/21/2017  E:  11/21/2017     This report was finalized on 11/21/2017 9:39 PM by DR. Zacheyr Gastelum MD.                    Discharge Details      Bernabe Irwin   Home Medication Instructions RAUDEL:[id number]    Printed on:11/22/17 1136   Medication Information                      amoxicillin-clavulanate (AUGMENTIN) 875-125 MG per tablet  Take 1 tablet by mouth Every 12 (Twelve) Hours for 6 doses. Indications: Pneumonia             aspirin 81 MG chewable tablet  Chew 1 tablet daily.             calcium carbonate (TUMS) 500 MG chewable tablet  Chew 1 tablet Daily.             carbidopa-levodopa CR (SINEMET CR)  MG per CR tablet  Take 1 tablet by mouth 3 (Three) Times a Day.             Cholecalciferol (VITAMIN D3) 5000 UNITS capsule capsule  Take 5,000 Units by mouth Daily.             clopidogrel (PLAVIX) 75 MG tablet  Take 75 mg by mouth daily.             docusate sodium (COLACE) 100 MG capsule  Take 100 mg by mouth 2 (Two) Times a Day.             donepezil (ARICEPT) 23 MG tablet  TAKE 1 TABLET BY MOUTH DAILY             doxycycline (VIBRAMYCIN) 100 MG capsule  Take 1 capsule by mouth Every 12 (Twelve) Hours for 6 doses. Indications: Pneumonia             entacapone (COMTAN) 200 MG tablet  Take 200 mg by mouth 3 (Three) Times a Day.             famotidine (PEPCID) 20 MG tablet  Take 1 tablet by mouth 2 (Two) Times a Day.             pramipexole (MIRAPEX) 0.75 MG tablet  Take 1 tablet by mouth 3 (Three) Times a Day.             pravastatin (PRAVACHOL) 80 MG tablet  Take 80 mg by mouth daily.             QUEtiapine (SEROquel) 25 MG tablet  Take 25 mg by mouth At Night As Needed.             saccharomyces boulardii (FLORASTOR) 250 MG capsule  Take 1 capsule by mouth 2 (Two) Times a Day.             sertraline (ZOLOFT) 50 MG tablet  TAKE ONE TABLET BY MOUTH EVERY  DAY AS DIREDTED             vitamin B-12 (CYANOCOBALAMIN) 500 MCG tablet  Take 500 mcg by mouth Daily.             vitamin D (ERGOCALCIFEROL) 62433 units capsule capsule  Take 50,000 Units by mouth 1 (One) Time Per Week.                   Discharge Disposition:  Home or Self Care    Discharge Diet:  Diet Instructions     Diet: Regular; Thin       Discharge Diet:  Regular   Fluid Consistency:  Thin                 Discharge Activity:  Activity Instructions     Activity as Tolerated                     No future appointments.    Additional Instructions for the Follow-ups that You Need to Schedule     Discharge Follow-up with PCP    As directed    Follow Up Details:  1 week                 Time Spent on Discharge:  50 min     ANGEL Dawn  11/22/17  11:36 AM